# Patient Record
Sex: FEMALE | Race: WHITE | NOT HISPANIC OR LATINO | Employment: FULL TIME | URBAN - METROPOLITAN AREA
[De-identification: names, ages, dates, MRNs, and addresses within clinical notes are randomized per-mention and may not be internally consistent; named-entity substitution may affect disease eponyms.]

---

## 2021-02-22 ENCOUNTER — OFFICE VISIT (OUTPATIENT)
Dept: FAMILY MEDICINE CLINIC | Facility: CLINIC | Age: 26
End: 2021-02-22

## 2021-02-22 VITALS
TEMPERATURE: 100.2 F | WEIGHT: 102 LBS | SYSTOLIC BLOOD PRESSURE: 108 MMHG | RESPIRATION RATE: 14 BRPM | DIASTOLIC BLOOD PRESSURE: 70 MMHG | OXYGEN SATURATION: 99 % | HEIGHT: 65 IN | BODY MASS INDEX: 17 KG/M2 | HEART RATE: 90 BPM

## 2021-02-22 DIAGNOSIS — Z11.59 ENCOUNTER FOR HEPATITIS C SCREENING TEST FOR LOW RISK PATIENT: ICD-10-CM

## 2021-02-22 DIAGNOSIS — N30.10 INTERSTITIAL CYSTITIS: Primary | ICD-10-CM

## 2021-02-22 LAB
BILIRUB BLD-MCNC: NEGATIVE MG/DL
CLARITY, POC: CLEAR
COLOR UR: YELLOW
GLUCOSE UR STRIP-MCNC: NEGATIVE MG/DL
KETONES UR QL: NEGATIVE
LEUKOCYTE EST, POC: NEGATIVE
NITRITE UR-MCNC: NEGATIVE MG/ML
PH UR: 6 [PH] (ref 5–8)
PROT UR STRIP-MCNC: NEGATIVE MG/DL
RBC # UR STRIP: NEGATIVE /UL
SP GR UR: 1.03 (ref 1–1.03)
UROBILINOGEN UR QL: NORMAL

## 2021-02-22 PROCEDURE — 99203 OFFICE O/P NEW LOW 30 MIN: CPT | Performed by: FAMILY MEDICINE

## 2021-02-22 PROCEDURE — 81002 URINALYSIS NONAUTO W/O SCOPE: CPT | Performed by: FAMILY MEDICINE

## 2021-02-22 RX ORDER — PENTOSAN POLYSULFATE SODIUM 100 MG/1
100 CAPSULE, GELATIN COATED ORAL
Qty: 90 CAPSULE | Refills: 3 | Status: SHIPPED | OUTPATIENT
Start: 2021-02-22 | End: 2022-02-10

## 2021-02-22 NOTE — PROGRESS NOTES
"Chief Complaint  Establish Care (Hasn't seen a PCP in years ) and Abd pain-dx w/ 'IC'    Subjective          Salud Bocanegra presents to [facility]  History of Present Illness  Here to establish care  Hasn't had primary care in years.   She does have a history of interstitial cystitis, symptoms currently present. Experiences suprapubic pain, dysuria.  Gynecologist initially diagnosed IC, treated with Elmiron, but no longer taking.  States that she does not like to take medications on a regular basis.  Caffeine, chocolate are triggers and will make symptoms worse.  Symptoms have improved since she has decreased her consumption of caffeine.    She does have both anxiety and depression.   Has taken medication to treat in the past, but eventually weans herself off.   Celexa did improve symptoms while taking, however does not wish to resume any medication for treatment of mood at this time.  Prozac caused her to have suicidal ideations, will avoid.     Objective   Vital Signs:   /70   Pulse 90   Temp 100.2 °F (37.9 °C)   Resp 14   Ht 165.1 cm (65\")   Wt 46.3 kg (102 lb)   SpO2 99%   BMI 16.97 kg/m²     Physical Exam  Vitals signs and nursing note reviewed.   Constitutional:       Appearance: She is well-developed.   HENT:      Head: Normocephalic and atraumatic.      Right Ear: Tympanic membrane, ear canal and external ear normal.      Left Ear: Tympanic membrane, ear canal and external ear normal.      Nose: Nose normal.   Eyes:      Conjunctiva/sclera: Conjunctivae normal.   Neck:      Musculoskeletal: Neck supple.   Cardiovascular:      Rate and Rhythm: Normal rate and regular rhythm.      Heart sounds: Normal heart sounds. No murmur.   Pulmonary:      Effort: Pulmonary effort is normal.      Breath sounds: Normal breath sounds. No wheezing.   Abdominal:      Palpations: Abdomen is soft.      Tenderness: There is abdominal tenderness in the suprapubic area. There is no " right CVA tenderness, left CVA tenderness or guarding.   Musculoskeletal:         General: No deformity.   Lymphadenopathy:      Cervical: No cervical adenopathy.   Skin:     General: Skin is warm and dry.   Neurological:      General: No focal deficit present.      Mental Status: She is alert and oriented to person, place, and time.   Psychiatric:         Mood and Affect: Mood normal.         Behavior: Behavior normal.         Thought Content: Thought content normal.        Result Review :                 Assessment and Plan    Diagnoses and all orders for this visit:    1. Interstitial cystitis (Primary)  -     CBC & Differential  -     Comprehensive Metabolic Panel  -     TSH Rfx On Abnormal To Free T4  -     POC Urinalysis Dipstick  -     pentosan polysulfate (Elmiron) 100 MG capsule; Take 1 capsule by mouth 3 (Three) Times a Day Before Meals.  Dispense: 90 capsule; Refill: 3    2. Encounter for hepatitis C screening test for low risk patient  -     Hepatitis C Antibody    UA normal, will resume treatment with Elmiron for IC.  If symptoms persist, consider urology consult.  At this time, she declines treatment for anxiety and depression.  Return if symptoms worsen.    Follow Up   Return in about 6 months (around 8/22/2021) for Recheck.  Patient was given instructions and counseling regarding her condition or for health maintenance advice. Please see specific information pulled into the AVS if appropriate.

## 2021-02-22 NOTE — PATIENT INSTRUCTIONS
Interstitial Cystitis    Interstitial cystitis is inflammation of the bladder. This may cause pain in the bladder area as well as a frequent and urgent need to urinate. The bladder is a hollow organ in the lower part of the abdomen. It stores urine after the urine is made in the kidneys.  The severity of interstitial cystitis can vary from person to person. You may have flare-ups, and then your symptoms may go away for a while. For many people, it becomes a long-term (chronic) problem.  What are the causes?  The cause of this condition is not known.  What increases the risk?  The following factors may make you more likely to develop this condition:  · You are female.  · You have fibromyalgia.  · You have irritable bowel syndrome (IBS).  · You have endometriosis.  This condition may be aggravated by:  · Stress.  · Smoking.  · Spicy foods.  What are the signs or symptoms?  Symptoms of interstitial cystitis vary, and they can change over time. Symptoms may include:  · Discomfort or pain in the bladder area, which is in the lower abdomen. Pain can range from mild to severe. The pain may change in intensity as the bladder fills with urine or as it empties.  · Pain in the pelvic area, between the hip bones.  · An urgent need to urinate.  · Frequent urination.  · Pain during urination.  · Pain during sex.  · Blood in the urine.  For women, symptoms often get worse during menstruation.  How is this diagnosed?  This condition is diagnosed based on your symptoms, your medical history, and a physical exam. You may have tests to rule out other conditions, such as:  · Urine tests.  · Cystoscopy. For this test, a tool similar to a very thin telescope is used to look into your bladder.  · Biopsy. This involves taking a sample of tissue from the bladder to be examined under a microscope.  How is this treated?  There is no cure for this condition, but treatment can help you control your symptoms. Work closely with your health care  provider to find the most effective treatments for you. Treatment options may include:  · Medicines to relieve pain and reduce how often you feel the need to urinate.  · Learning ways to control when you urinate (bladder training).  · Lifestyle changes, such as changing your diet or taking steps to control stress.  · Using a device that provides electrical stimulation to your nerves, which can relieve pain (neuromodulation therapy). The device is placed on your back, where it blocks the nerves that cause you to feel pain in your bladder area.  · A procedure that stretches your bladder by filling it with air or fluid.  · Surgery. This is rare. It is only done for extreme cases, if other treatments do not help.  Follow these instructions at home:  Bladder training    · Use bladder training techniques as directed. Techniques may include:  ? Urinating at scheduled times.  ? Training yourself to delay urination.  ? Doing exercises (Kegel exercises) to strengthen the muscles that control urine flow.  · Keep a bladder diary.  ? Write down the times that you urinate and any symptoms that you have. This can help you find out which foods, liquids, or activities make your symptoms worse.  ? Use your bladder diary to schedule bathroom trips. If you are away from home, plan to be near a bathroom at each of your scheduled times.  · Make sure that you urinate just before you leave the house and just before you go to bed.  Eating and drinking  · Make dietary changes as recommended by your health care provider. You may need to avoid:  ? Spicy foods.  ? Foods that contain a lot of potassium.  · Limit your intake of beverages that make you need to urinate. These include:  ? Caffeinated beverages like soda, coffee, and tea.  ? Alcohol.  General instructions  · Take over-the-counter and prescription medicines only as told by your health care provider.  · Do not drink alcohol.  · You can try a warm or cool compress over your bladder for  comfort.  · Avoid wearing tight clothing.  · Do not use any products that contain nicotine or tobacco, such as cigarettes and e-cigarettes. If you need help quitting, ask your health care provider.  · Keep all follow-up visits as told by your health care provider. This is important.  Contact a health care provider if you have:  · Symptoms that do not get better with treatment.  · Pain or discomfort that gets worse.  · More frequent urges to urinate.  · A fever.  Get help right away if:  · You have no control over when you urinate.  Summary  · Interstitial cystitis is inflammation of the bladder.  · This condition may cause pain in the bladder area as well as a frequent and urgent need to urinate.  · You may have flare-ups of the condition, and then it may go away for a while. For many people, it becomes a long-term (chronic) problem.  · There is no cure for interstitial cystitis, but treatment methods are available to control your symptoms.  This information is not intended to replace advice given to you by your health care provider. Make sure you discuss any questions you have with your health care provider.  Document Revised: 11/30/2018 Document Reviewed: 11/12/2018  Elsevier Patient Education © 2020 [employer]

## 2021-02-23 LAB
ALBUMIN SERPL-MCNC: 4.8 G/DL (ref 3.9–5)
ALBUMIN/GLOB SERPL: 2 {RATIO} (ref 1.2–2.2)
ALP SERPL-CCNC: 74 IU/L (ref 39–117)
ALT SERPL-CCNC: 17 IU/L (ref 0–32)
AST SERPL-CCNC: 20 IU/L (ref 0–40)
BASOPHILS # BLD AUTO: 0.1 X10E3/UL (ref 0–0.2)
BASOPHILS NFR BLD AUTO: 1 %
BILIRUB SERPL-MCNC: 0.3 MG/DL (ref 0–1.2)
BUN SERPL-MCNC: 13 MG/DL (ref 6–20)
BUN/CREAT SERPL: 18 (ref 9–23)
CALCIUM SERPL-MCNC: 9.2 MG/DL (ref 8.7–10.2)
CHLORIDE SERPL-SCNC: 104 MMOL/L (ref 96–106)
CO2 SERPL-SCNC: 21 MMOL/L (ref 20–29)
CREAT SERPL-MCNC: 0.73 MG/DL (ref 0.57–1)
EOSINOPHIL # BLD AUTO: 0.2 X10E3/UL (ref 0–0.4)
EOSINOPHIL NFR BLD AUTO: 3 %
ERYTHROCYTE [DISTWIDTH] IN BLOOD BY AUTOMATED COUNT: 11.8 % (ref 11.7–15.4)
GLOBULIN SER CALC-MCNC: 2.4 G/DL (ref 1.5–4.5)
GLUCOSE SERPL-MCNC: 85 MG/DL (ref 65–99)
HCT VFR BLD AUTO: 39.7 % (ref 34–46.6)
HCV AB S/CO SERPL IA: <0.1 S/CO RATIO (ref 0–0.9)
HGB BLD-MCNC: 13.6 G/DL (ref 11.1–15.9)
IMM GRANULOCYTES # BLD AUTO: 0 X10E3/UL (ref 0–0.1)
IMM GRANULOCYTES NFR BLD AUTO: 1 %
LYMPHOCYTES # BLD AUTO: 2 X10E3/UL (ref 0.7–3.1)
LYMPHOCYTES NFR BLD AUTO: 28 %
MCH RBC QN AUTO: 31.1 PG (ref 26.6–33)
MCHC RBC AUTO-ENTMCNC: 34.3 G/DL (ref 31.5–35.7)
MCV RBC AUTO: 91 FL (ref 79–97)
MONOCYTES # BLD AUTO: 0.4 X10E3/UL (ref 0.1–0.9)
MONOCYTES NFR BLD AUTO: 6 %
NEUTROPHILS # BLD AUTO: 4.3 X10E3/UL (ref 1.4–7)
NEUTROPHILS NFR BLD AUTO: 61 %
PLATELET # BLD AUTO: 239 X10E3/UL (ref 150–450)
POTASSIUM SERPL-SCNC: 3.8 MMOL/L (ref 3.5–5.2)
PROT SERPL-MCNC: 7.2 G/DL (ref 6–8.5)
RBC # BLD AUTO: 4.37 X10E6/UL (ref 3.77–5.28)
SODIUM SERPL-SCNC: 139 MMOL/L (ref 134–144)
TSH SERPL DL<=0.005 MIU/L-ACNC: 1.14 UIU/ML (ref 0.45–4.5)
WBC # BLD AUTO: 7 X10E3/UL (ref 3.4–10.8)

## 2022-02-10 ENCOUNTER — OFFICE VISIT (OUTPATIENT)
Dept: FAMILY MEDICINE CLINIC | Facility: CLINIC | Age: 27
End: 2022-02-10

## 2022-02-10 VITALS
OXYGEN SATURATION: 100 % | HEIGHT: 65 IN | HEART RATE: 68 BPM | SYSTOLIC BLOOD PRESSURE: 110 MMHG | RESPIRATION RATE: 20 BRPM | TEMPERATURE: 99 F | WEIGHT: 108.6 LBS | DIASTOLIC BLOOD PRESSURE: 60 MMHG | BODY MASS INDEX: 18.09 KG/M2

## 2022-02-10 DIAGNOSIS — M79.641 BILATERAL HAND PAIN: Primary | ICD-10-CM

## 2022-02-10 DIAGNOSIS — M79.642 BILATERAL HAND PAIN: Primary | ICD-10-CM

## 2022-02-10 DIAGNOSIS — F17.200 CURRENT SMOKER: ICD-10-CM

## 2022-02-10 PROCEDURE — 99213 OFFICE O/P EST LOW 20 MIN: CPT | Performed by: FAMILY MEDICINE

## 2022-02-10 RX ORDER — BUPROPION HYDROCHLORIDE 150 MG/1
TABLET, EXTENDED RELEASE ORAL
Qty: 180 TABLET | Refills: 0 | Status: SHIPPED | OUTPATIENT
Start: 2022-02-10

## 2022-02-10 RX ORDER — IBUPROFEN 800 MG/1
800 TABLET ORAL EVERY 8 HOURS PRN
Qty: 60 TABLET | Refills: 1 | Status: SHIPPED | OUTPATIENT
Start: 2022-02-10

## 2022-02-10 NOTE — PROGRESS NOTES
"Chief Complaint  bilateral hand pain (getting worse over the past 6 months ) and Nicotine Dependence (pt has not tried any medication to help stop before )    Subjective          Salud Bocanegra presents to [facility]  History of Present Illness  Bilateral hand pain getting worse x 6 months  Years ago, she was told that she has autoimmune disorder, but have never been provide specific diagnosis.  Overuse with hand and wrist makes pain worse  Edema in fingers and hands at times  Denies numbness, tingling, burning   Treats with ibuprofen as needed, which does help her symptoms    She is interested in quitting smoking.   Has anxiety, smoking is one way she deals with stress.   Worried about stopping smoking causing anxiety to worsen, but knows this would be best for her in [facility].     Answers for HPI/ROS submitted by the patient on 2/10/2022  Please describe your symptoms.: Join pain and occasional rash on joints  Have you had these symptoms before?: Yes  How long have you been having these symptoms?: Greater than 2 weeks  Please list any medications you are currently taking for this condition.: None  What is the primary reason for your visit?: Other      The following portions of the patient's history were reviewed and updated as appropriate: allergies, current medications, past family history, past medical history, past social history, past surgical history and problem list.    Objective   Vital Signs:   /60   Pulse 68   Temp 99 °F (37.2 °C)   Resp 20   Ht 165.1 cm (65\")   Wt 49.3 kg (108 lb 9.6 oz)   SpO2 100%   BMI 18.07 kg/m²     Physical Exam  Vitals and nursing note reviewed.   Constitutional:       Appearance: She is well-developed.   HENT:      Head: Normocephalic and atraumatic.      Right Ear: External ear normal.      Left Ear: External ear normal.      Nose: Nose normal.   Eyes:      Conjunctiva/sclera: Conjunctivae normal.   Pulmonary:      Effort: " Pulmonary effort is normal. No respiratory distress.   Musculoskeletal:         General: No swelling or deformity.   Skin:     General: Skin is warm.   Neurological:      Mental Status: She is alert and oriented to person, place, and time.   Psychiatric:         Behavior: Behavior normal.        Result Review :                 Assessment and Plan    Diagnoses and all orders for this visit:    1. Bilateral hand pain (Primary)  -     MARLEY by IFA, Reflex 9-biomarkers profile; Future  -     Sedimentation Rate; Future  -     C-reactive Protein; Future  -     Rheumatoid Factor; Future  -     ibuprofen (ADVIL,MOTRIN) 800 MG tablet; Take 1 tablet by mouth Every 8 (Eight) Hours As Needed for Moderate Pain .  Dispense: 60 tablet; Refill: 1  -     Comprehensive Metabolic Panel; Future    2. Current smoker  -     buPROPion SR (Wellbutrin SR) 150 MG 12 hr tablet; Take 1 tab po qd x 3 days, then increase to 1 tab BID  Dispense: 180 tablet; Refill: 0  -     Comprehensive Metabolic Panel; Future    She will return to complete labs to evaluate for possible autoimmune condition causing chronic joint pain.   Joint pain does resolve with ibuprofen, continue as needed  Start bupropion to help with tobacco cessation.       Follow Up   Return if symptoms worsen or fail to improve.  Patient was given instructions and counseling regarding her condition or for health maintenance advice. Please see specific information pulled into the AVS if appropriate.

## 2022-03-18 DIAGNOSIS — M79.642 BILATERAL HAND PAIN: ICD-10-CM

## 2022-03-18 DIAGNOSIS — F17.200 CURRENT SMOKER: ICD-10-CM

## 2022-03-18 DIAGNOSIS — M79.641 BILATERAL HAND PAIN: ICD-10-CM

## 2022-03-20 LAB
ALBUMIN SERPL-MCNC: 4.6 G/DL (ref 3.9–5)
ALBUMIN/GLOB SERPL: 2.1 {RATIO} (ref 1.2–2.2)
ALP SERPL-CCNC: 66 IU/L (ref 44–121)
ALT SERPL-CCNC: 13 IU/L (ref 0–32)
ANA TITR SER IF: NEGATIVE {TITER}
AST SERPL-CCNC: 18 IU/L (ref 0–40)
BILIRUB SERPL-MCNC: 0.2 MG/DL (ref 0–1.2)
BUN SERPL-MCNC: 19 MG/DL (ref 6–20)
BUN/CREAT SERPL: 27 (ref 9–23)
CALCIUM SERPL-MCNC: 9.5 MG/DL (ref 8.7–10.2)
CHLORIDE SERPL-SCNC: 103 MMOL/L (ref 96–106)
CO2 SERPL-SCNC: 20 MMOL/L (ref 20–29)
CREAT SERPL-MCNC: 0.7 MG/DL (ref 0.57–1)
CRP SERPL-MCNC: <1 MG/L (ref 0–10)
EGFRCR SERPLBLD CKD-EPI 2021: 120 ML/MIN/1.73
ERYTHROCYTE [SEDIMENTATION RATE] IN BLOOD BY WESTERGREN METHOD: 2 MM/HR (ref 0–32)
GLOBULIN SER CALC-MCNC: 2.2 G/DL (ref 1.5–4.5)
GLUCOSE SERPL-MCNC: 86 MG/DL (ref 65–99)
LABORATORY COMMENT REPORT: NORMAL
POTASSIUM SERPL-SCNC: 4 MMOL/L (ref 3.5–5.2)
PROT SERPL-MCNC: 6.8 G/DL (ref 6–8.5)
RHEUMATOID FACT SERPL-ACNC: <10 IU/ML
SODIUM SERPL-SCNC: 139 MMOL/L (ref 134–144)

## 2022-03-21 ENCOUNTER — TELEPHONE (OUTPATIENT)
Dept: FAMILY MEDICINE CLINIC | Facility: CLINIC | Age: 27
End: 2022-03-21

## 2022-03-21 NOTE — TELEPHONE ENCOUNTER
Caller: Angelina Velazquez    Relationship: Self    Best call back number: [phone]    What is the best time to reach you:10:30 IS BEST IF NO ANSWER  LEAVE A VOICEMAIL    Who are you requesting to speak with (clinical staff, provider,  specific staff member): DR BLOUNT    Do you know the name of the person who called: ANGELINA VELAZQUEZ    What was the call regarding: WANTS SOMEONE TO GO OVER TEST RESULTS WITH HER    Do you require a callback: YES

## 2022-03-27 DIAGNOSIS — M79.641 BILATERAL HAND PAIN: Primary | ICD-10-CM

## 2022-03-27 DIAGNOSIS — M79.642 BILATERAL HAND PAIN: Primary | ICD-10-CM

## 2022-11-23 ENCOUNTER — TELEPHONE (OUTPATIENT)
Dept: FAMILY MEDICINE CLINIC | Facility: CLINIC | Age: 27
End: 2022-11-23

## 2022-11-23 NOTE — TELEPHONE ENCOUNTER
Caller: Salud Bocanegra    Relationship: Self    Best call back number:    [phone]        What medication are you requesting: MEDICATION FOR SYMPTOMS  What are your current symptoms: BURNING UPON URINATION    How long have you been experiencing symptoms: 1 DAY  Have you had these symptoms before:    [x] Yes  [] No    Have you been treated for these symptoms before:   [x] Yes  [] No    If a prescription is needed, what is your preferred pharmacy and phone number: [facility] [address] [place] [address] [phone] [facility] [phone]

## 2022-11-23 NOTE — TELEPHONE ENCOUNTER
Pt advised to go to urgent care, [facility] or express care since we didn't have any openings to evaluate her today

## 2022-12-15 ENCOUNTER — OFFICE VISIT (OUTPATIENT)
Dept: FAMILY MEDICINE CLINIC | Facility: CLINIC | Age: 27
End: 2022-12-15

## 2022-12-15 VITALS
TEMPERATURE: 97.7 F | DIASTOLIC BLOOD PRESSURE: 60 MMHG | RESPIRATION RATE: 16 BRPM | HEIGHT: 65 IN | HEART RATE: 71 BPM | BODY MASS INDEX: 17.83 KG/M2 | OXYGEN SATURATION: 98 % | SYSTOLIC BLOOD PRESSURE: 110 MMHG | WEIGHT: 107 LBS

## 2022-12-15 DIAGNOSIS — R10.11 RUQ PAIN: ICD-10-CM

## 2022-12-15 DIAGNOSIS — R42 LIGHTHEADEDNESS: ICD-10-CM

## 2022-12-15 DIAGNOSIS — R00.2 HEART PALPITATIONS: Primary | ICD-10-CM

## 2022-12-15 PROCEDURE — 99214 OFFICE O/P EST MOD 30 MIN: CPT | Performed by: FAMILY MEDICINE

## 2022-12-15 RX ORDER — HYDROCODONE BITARTRATE AND ACETAMINOPHEN 5; 325 MG/1; MG/1
TABLET ORAL
COMMUNITY
Start: 2022-12-11

## 2022-12-15 RX ORDER — METOPROLOL SUCCINATE 25 MG/1
25 TABLET, EXTENDED RELEASE ORAL
COMMUNITY
Start: 2022-12-10 | End: 2022-12-15 | Stop reason: SDUPTHER

## 2022-12-15 RX ORDER — METOPROLOL SUCCINATE 25 MG/1
25 TABLET, EXTENDED RELEASE ORAL DAILY
Qty: 90 TABLET | Refills: 0 | Status: SHIPPED | OUTPATIENT
Start: 2022-12-15

## 2022-12-15 NOTE — PROGRESS NOTES
"Chief Complaint  Palpitations (X1mo, sees cardiology in [place]) and Abdominal Pain (ED 12.11.22, [facility] )    Subjective          Salud Bocanegra presents to [facility]  History of Present Illness    The patient has been experiencing palpitations for the last 1 month.   She was evaluated by a cardiologist in [place], [place], Dr. Rossana Loaiza, who completed her evaluation and started her on metoprolol. The patient reports her symptoms have improved on the medication.     The patient reports that when she stands up she experiences palpitations.   She notes her work-up in cardiology included a Holter monitor; however, she reports being ill with influenza during the work-up and  the cardiologist expressed her symptoms were more neurological in nature.   The patient states she experienced some dizziness, shortness of breath, and near syncope.  She has a history of an abnormal EKG which was completed at work; hoewver, her stress test results were within normal limits and her echocardiogram was within normal limits.   The patient denies losing consciousness; however, she does feel lightheaded, and tachypnea when she feels her palpitations.   She reports a positive family history of cardiac problems in her father who had a history of congestive heart failure and passed away at 51-years-old from myocardial infarction.    Additionally, she was seen at [facility] emergency room on 12/11/2022 for evaluation of abdominal pain.   She states \"it was the worst pain she ever felt in her life\" in upper right quadrant.   The patient completed a CT scan revealing negative findings for gallstones.   She notes her symptoms have improved slightly.   The patient notes consuming pizza prior to experiencing her abdominal pain.   She denies heart burn at the time of her abdominal pain; however, reports nausea and diarrhea that began 30 mins after the pain started that was bright " orange in color. She adds taking antidiarrheals.    The following portions of the patient's history were reviewed and updated as appropriate: allergies, current medications, past family history, past medical history, past social history, past surgical history and problem list.    Objective      Physical Exam  Vitals reviewed.   Constitutional:       Appearance: She is well-developed.   HENT:      Head: Normocephalic and atraumatic.   Neck:      Vascular: No carotid bruit.   Cardiovascular:      Rate and Rhythm: Normal rate and regular rhythm.      Heart sounds: Normal heart sounds.   Pulmonary:      Effort: Pulmonary effort is normal.      Breath sounds: Normal breath sounds.   Abdominal:      Palpations: Abdomen is soft.      Tenderness: There is no abdominal tenderness. There is no guarding. Negative signs include Simpson's sign.   Neurological:      Mental Status: She is alert and oriented to person, place, and time.   Psychiatric:         Behavior: Behavior normal.        Result Review :                Assessment and Plan    Diagnoses and all orders for this visit:    1. Heart palpitations (Primary)  Comments:  Refills sent to pharmacy of metorpolol. Complete laboratory studies. Consider referral to new cardiologist.  Orders:  -     CBC & Differential  -     Comprehensive Metabolic Panel  -     TSH  -     metoprolol succinate XL (TOPROL-XL) 25 MG 24 hr tablet; Take 1 tablet by mouth Daily.  Dispense: 90 tablet; Refill: 0    2. Lightheadedness  Comments:  Consider referral to neurology if symptoms persist.  Orders:  -     Duplex Carotid Ultrasound CAR; Future  -     CBC & Differential  -     Comprehensive Metabolic Panel  -     TSH    3. RUQ pain  Comments:  Complete  gallbladder ultrasound.  Orders:  -     CBC & Differential  -     Comprehensive Metabolic Panel  -     TSH  -     US Gallbladder; Future        Follow Up   No follow-ups on file.  Patient was given instructions and counseling regarding her condition  or for health maintenance advice. Please see specific information pulled into the AVS if appropriate.     Transcribed from ambient dictation for Ramona Mcnally DO by Elyssa Segundo.  12/16/22   10:13 EST    Patient or patient representative verbalized consent to the visit recording.  I have personally performed the services described in this document as transcribed by the above individual, and it is both accurate and complete.  Ramona Mcnally DO  12/20/2022  12:28 EST

## 2022-12-16 LAB
ALBUMIN SERPL-MCNC: 4.5 G/DL (ref 3.9–5)
ALBUMIN/GLOB SERPL: 2 {RATIO} (ref 1.2–2.2)
ALP SERPL-CCNC: 87 IU/L (ref 44–121)
ALT SERPL-CCNC: 7 IU/L (ref 0–32)
AST SERPL-CCNC: 10 IU/L (ref 0–40)
BASOPHILS # BLD AUTO: 0.1 X10E3/UL (ref 0–0.2)
BASOPHILS NFR BLD AUTO: 1 %
BILIRUB SERPL-MCNC: 0.3 MG/DL (ref 0–1.2)
BUN SERPL-MCNC: 7 MG/DL (ref 6–20)
BUN/CREAT SERPL: 10 (ref 9–23)
CALCIUM SERPL-MCNC: 9.2 MG/DL (ref 8.7–10.2)
CHLORIDE SERPL-SCNC: 105 MMOL/L (ref 96–106)
CO2 SERPL-SCNC: 24 MMOL/L (ref 20–29)
CREAT SERPL-MCNC: 0.69 MG/DL (ref 0.57–1)
EGFRCR SERPLBLD CKD-EPI 2021: 120 ML/MIN/1.73
EOSINOPHIL # BLD AUTO: 0.3 X10E3/UL (ref 0–0.4)
EOSINOPHIL NFR BLD AUTO: 4 %
ERYTHROCYTE [DISTWIDTH] IN BLOOD BY AUTOMATED COUNT: 12.6 % (ref 11.7–15.4)
GLOBULIN SER CALC-MCNC: 2.3 G/DL (ref 1.5–4.5)
GLUCOSE SERPL-MCNC: 73 MG/DL (ref 70–99)
HCT VFR BLD AUTO: 34.4 % (ref 34–46.6)
HGB BLD-MCNC: 12.2 G/DL (ref 11.1–15.9)
IMM GRANULOCYTES # BLD AUTO: 0 X10E3/UL (ref 0–0.1)
IMM GRANULOCYTES NFR BLD AUTO: 0 %
LYMPHOCYTES # BLD AUTO: 1.7 X10E3/UL (ref 0.7–3.1)
LYMPHOCYTES NFR BLD AUTO: 25 %
MCH RBC QN AUTO: 31.4 PG (ref 26.6–33)
MCHC RBC AUTO-ENTMCNC: 35.5 G/DL (ref 31.5–35.7)
MCV RBC AUTO: 89 FL (ref 79–97)
MONOCYTES # BLD AUTO: 0.5 X10E3/UL (ref 0.1–0.9)
MONOCYTES NFR BLD AUTO: 7 %
NEUTROPHILS # BLD AUTO: 4.4 X10E3/UL (ref 1.4–7)
NEUTROPHILS NFR BLD AUTO: 63 %
PLATELET # BLD AUTO: 218 X10E3/UL (ref 150–450)
POTASSIUM SERPL-SCNC: 3.9 MMOL/L (ref 3.5–5.2)
PROT SERPL-MCNC: 6.8 G/DL (ref 6–8.5)
RBC # BLD AUTO: 3.88 X10E6/UL (ref 3.77–5.28)
SODIUM SERPL-SCNC: 140 MMOL/L (ref 134–144)
TSH SERPL DL<=0.005 MIU/L-ACNC: 0.58 UIU/ML (ref 0.45–4.5)
WBC # BLD AUTO: 7 X10E3/UL (ref 3.4–10.8)

## 2023-01-09 ENCOUNTER — APPOINTMENT (OUTPATIENT)
Dept: ULTRASOUND IMAGING | Facility: HOSPITAL | Age: 28
End: 2023-01-09
Payer: COMMERCIAL

## 2023-01-09 ENCOUNTER — APPOINTMENT (OUTPATIENT)
Dept: CARDIOLOGY | Facility: HOSPITAL | Age: 28
End: 2023-01-09
Payer: COMMERCIAL

## 2023-02-06 ENCOUNTER — APPOINTMENT (OUTPATIENT)
Dept: ULTRASOUND IMAGING | Facility: HOSPITAL | Age: 28
End: 2023-02-06
Payer: COMMERCIAL

## 2023-02-06 ENCOUNTER — HOSPITAL ENCOUNTER (OUTPATIENT)
Dept: CARDIOLOGY | Facility: HOSPITAL | Age: 28
Discharge: HOME | End: 2023-02-06
Payer: COMMERCIAL

## 2023-02-06 ENCOUNTER — HOSPITAL ENCOUNTER (OUTPATIENT)
Dept: ULTRASOUND IMAGING | Facility: HOSPITAL | Age: 28
Discharge: HOME | End: 2023-02-06
Payer: COMMERCIAL

## 2023-02-06 VITALS — WEIGHT: 105.82 LBS | HEIGHT: 65 IN | BODY MASS INDEX: 17.63 KG/M2

## 2023-02-06 DIAGNOSIS — R42 LIGHTHEADEDNESS: ICD-10-CM

## 2023-02-06 DIAGNOSIS — R10.11 RUQ PAIN: ICD-10-CM

## 2023-02-06 LAB
BH CV XLRA MEAS LEFT DIST CCA EDV: 30.6 CM/SEC
BH CV XLRA MEAS LEFT DIST CCA PSV: 81.7 CM/SEC
BH CV XLRA MEAS LEFT DIST ICA EDV: -40.9 CM/SEC
BH CV XLRA MEAS LEFT DIST ICA PSV: -105 CM/SEC
BH CV XLRA MEAS LEFT ICA/CCA RATIO: 0.84
BH CV XLRA MEAS LEFT MID CCA EDV: 29.1 CM/SEC
BH CV XLRA MEAS LEFT MID CCA PSV: 109 CM/SEC
BH CV XLRA MEAS LEFT MID ICA EDV: -36.1 CM/SEC
BH CV XLRA MEAS LEFT MID ICA PSV: -92.7 CM/SEC
BH CV XLRA MEAS LEFT PROX CCA EDV: 34.6 CM/SEC
BH CV XLRA MEAS LEFT PROX CCA PSV: 135 CM/SEC
BH CV XLRA MEAS LEFT PROX ECA EDV: -24.4 CM/SEC
BH CV XLRA MEAS LEFT PROX ECA PSV: -95.9 CM/SEC
BH CV XLRA MEAS LEFT PROX ICA EDV: -24.4 CM/SEC
BH CV XLRA MEAS LEFT PROX ICA PSV: -71.5 CM/SEC
BH CV XLRA MEAS LEFT PROX SCLA PSV: 109 CM/SEC
BH CV XLRA MEAS LEFT VERTEBRAL A EDV: 23.6 CM/SEC
BH CV XLRA MEAS LEFT VERTEBRAL A PSV: 67.6 CM/SEC
BH CV XLRA MEAS RIGHT DIST CCA EDV: -28.3 CM/SEC
BH CV XLRA MEAS RIGHT DIST CCA PSV: -94.3 CM/SEC
BH CV XLRA MEAS RIGHT DIST ICA EDV: -40.1 CM/SEC
BH CV XLRA MEAS RIGHT DIST ICA PSV: -103 CM/SEC
BH CV XLRA MEAS RIGHT ICA/CCA RATIO: 0.86
BH CV XLRA MEAS RIGHT MID CCA EDV: 29.1 CM/SEC
BH CV XLRA MEAS RIGHT MID CCA PSV: 95.9 CM/SEC
BH CV XLRA MEAS RIGHT MID ICA EDV: -36.9 CM/SEC
BH CV XLRA MEAS RIGHT MID ICA PSV: -82.5 CM/SEC
BH CV XLRA MEAS RIGHT PROX CCA EDV: 22 CM/SEC
BH CV XLRA MEAS RIGHT PROX CCA PSV: 108 CM/SEC
BH CV XLRA MEAS RIGHT PROX ECA EDV: -26.7 CM/SEC
BH CV XLRA MEAS RIGHT PROX ECA PSV: -101 CM/SEC
BH CV XLRA MEAS RIGHT PROX ICA EDV: -27.5 CM/SEC
BH CV XLRA MEAS RIGHT PROX ICA PSV: -77.8 CM/SEC
BH CV XLRA MEAS RIGHT PROX SCLA PSV: 93.5 CM/SEC
BH CV XLRA MEAS RIGHT VERTEBRAL A EDV: 12.6 CM/SEC
BH CV XLRA MEAS RIGHT VERTEBRAL A PSV: 45.6 CM/SEC
LEFT ARM BP: NORMAL MMHG
MAXIMAL PREDICTED HEART RATE: 191 BPM
RIGHT ARM BP: NORMAL MMHG
STRESS TARGET HR: 162 BPM

## 2023-02-06 PROCEDURE — 76705 ECHO EXAM OF ABDOMEN: CPT

## 2023-02-06 PROCEDURE — 93880 EXTRACRANIAL BILAT STUDY: CPT

## 2023-02-06 PROCEDURE — 93880 EXTRACRANIAL BILAT STUDY: CPT | Performed by: INTERNAL MEDICINE

## 2023-08-17 ENCOUNTER — E-VISIT (OUTPATIENT)
Dept: FAMILY MEDICINE CLINIC | Facility: TELEHEALTH | Age: 28
End: 2023-08-17
Payer: COMMERCIAL

## 2023-08-17 NOTE — E-VISIT TREATED
Chief Complaint: Bladder infection (UTI)   Patient introduction   Patient is 28-year-old female. Patient provided the following organ inventory: Presence of a vagina, ovaries, and breasts.   Patient has had dysuria for 1 to 3 days.   Urine is yellow with a strong or pungent odor.   General presentation   Patient has not had a fever. No nausea or vomiting.   Moderate abdominal or pelvic pain.   No back pain.   No flank pain.   The following treatments were helpful for current symptoms:    Ibuprofen    Phenazopyridine   The following treatments were not helpful for current symptoms:    Acetaminophen   Previous history of UTI. Current symptoms feel exactly the same as previous UTIs. Received treatment for UTI 1 to 3 times in last year. Patient's last use of antibiotics for UTI was more than 3 months ago.   No known history of yeast infections as a result of taking antibiotics for past UTIs.   No history of pyelonephritis. No history of kidney stones.   Had sexual intercourse in the past week. Does not use diaphragm. No unprotected sexual intercourse with a new partner in the last 2 weeks. Has not been exposed to sexually transmitted infections in the last month.   No recent history of cycling, motorcycling, or riding horseback. Does not wear tight-fitting pants/undergarments. No new soap, lotion, or cosmetics.   Patient is not being treated for diabetes mellitus.   Review of red flags/alarm symptoms:    No recent hospitalizations or nursing home care (last 3 months)    No history of renal failure    No recent history of urologic instrumentation    No anatomic abnormalities of the urinary tract    No abnormal vaginal discharge    No visible vaginal sores    No pain with sexual intercourse    No abnormal vaginal bleeding or spotting   Pregnancy/menstrual status/breastfeeding:   Patient is not pregnant. Patient is not breastfeeding. Regarding date of last menstrual period, patient writes: I had a partial hysterectomy  .   Current medications   Currently taking metoprolol succinate XL 25 MG 24 hr tablet.   Medication allergies   None.   Medication contraindication review   Not taking ACE inhibitors and ARBs.   No history of Toya-Danlos syndrome, folate deficiency, G6PD deficiency, high blood pressure, aortic aneurysm or dissection, Marfan syndrome, megaloblastic anemia, mononucleosis, myasthenia gravis, oliguria/anuria, or peripheral vascular disease.   No known history of amoxicillin-clavulanate-associated cholestatic jaundice or nitrofurantoin-associated cholestatic jaundice.   Past medical history   Immune conditions: No immunocompromising conditions.   No history of cancer.   Patient did not request an excuse note.   Assessment   Uncomplicated acute UTI.   This is the likely diagnosis based on patient's interview responses, including:    Symptom profile    Previous history of UTI    Current symptoms are exactly the same as previous UTIs   No recent history of kidney stones.   Plan   Medications:    nitrofurantoin monohydrate/macrocrystals 100 mg capsule RX 100mg 1 cap PO q12h 5d for infection. This medication is an antibiotic. Take it exactly as directed. You must finish the entire course of medication, even if you feel better after taking the first few doses. Amount is 10 cap.   The patient's prescription will be sent to:   [facility]   [address] [place] [address]   Phone: [phone]     Fax: [phone]   Education:    Condition and causes    Prevention    Treatment and self-care    When to call provider   Follow-up:   Patient to follow up as needed for progression or lack of improvement in symptoms within 3d.   ----------   Electronically signed by HIRAL Santos on 2023-08-17 at 15:54PM   ----------   Patient Interview Transcript:   Knowing about your anatomy is important for diagnosing and treating UTIs. The gender we have on file for you is female, but we realize that this might not tell the  whole story. Would you like to tell us more about your anatomy?    Yes   Not selected:    No   OK, which of these do you have? Select all that apply.    Vagina    Ovaries    Breasts   Not selected:    Uterus    Penis    Testes    Prostate   Which of these symptoms do you have? Select all that apply.    Pain or burning while urinating   Not selected:    Frequent urination    Sudden urge to urinate and it's hard to hold the urine in   How long have you had these symptoms? Select one.    1 to 3 days   Not selected:    Less than 24 hours    4 to 6 days    7 to 10 days    More than 10 days   Since your current symptoms started, has it been difficult to start, stop, or delay urination? Select one.    No   Not selected:    Yes   What color is your urine? Select one.    Yellow   Not selected:    Clear    Cloudy    Pink or red   Does your urine smell strange (like ammonia) or stronger than usual? Select one.    Yes   Not selected:    No   Do you also have any of these symptoms? Select all that apply.    Pain, pressure, or discomfort in the lower abdomen   Not selected:    Fever    Nausea    Vomiting    Back pain    No   How would you describe your lower abdominal pain, pressure, or discomfort? Select one.    Moderate; it's uncomfortable and gets in the way of doing daily tasks   Not selected:    Mild; I only notice it when I pay attention to it    Severe; I can't get comfortable, and it stops me from doing daily tasks   Do you have any flank pain? The flank is the side of the body between the ribs and the hips.    No   Not selected:    Yes, in my left flank    Yes, in my right flank    Yes, in both my left and right flanks   Do you have any of these vaginal symptoms? Select all that apply.    No   Not selected:    Abnormal vaginal itching    Unscheduled or abnormal vaginal bleeding or spotting    Pain during sex    Visible sores on the vagina    Abnormal vaginal discharge   In the past 2 weeks, have you had a medical device  or instrument placed in your urinary tract? Examples include catheters, stents, and nephrostomy tubes. Select one.    No   Not selected:    Yes   Have you recently been hospitalized or been a resident of a nursing home or other long-term care facility? This doesn't include emergency room (ER) visits. Select one.    No   Not selected:    Yes, within the last 2 weeks    Yes, within the last 3 months   Have you ever had severe problems with your kidneys, such as kidney failure? Select one.    No, not that I recall   Not selected:    Yes   Kidney stones    No   Not selected:    Within the last year    More than a year ago   Kidney infection (pyelonephritis)    No   Not selected:    Within the last year    More than a year ago   Have you ever been diagnosed with any of these? Select all that apply.    No   Not selected:    Urinary reflux    Bladder diverticula    Single (or horseshoe) kidney    Duplicated urethra   Have you recently spent a lot of time cycling, riding a motorcycle, or horseback riding? Select one.    No   Not selected:    Yes   Have you recently held your urine for a long time after you felt the urge to go? Select one.    No   Not selected:    Yes   Have you recently avoided eating or drinking so you wouldn't have the urge to urinate as often? Select one.    No   Not selected:    Yes   Have you recently worn any tight-fitting underwear, tights, leggings, pants, or jeans? Select one.    No   Not selected:    Yes   Have you recently used any new soaps, lotions, or cosmetics on your genital area? Select one.    No   Not selected:    Yes   Do you use a diaphragm? Select one.    No   Not selected:    Yes   Are you pregnant? Select one.    No   Not selected:    Yes   When was your last menstrual period? If you don't currently have periods or no longer have periods, please briefly explain.    I had a partial hysterectomy   Are you breastfeeding? Select one.    No   Not selected:    Yes   Have you had sexual  intercourse in the past week? Recent sexual intercourse is a risk factor for urinary tract infections. Select one.    Yes   Not selected:    No   Have you been exposed to a sexually transmitted infection (STI or STD) in the last month? Examples include chlamydia, gonorrhea, trichomoniasis, and herpes. Select one.    No, not that I know of   Not selected:    Yes   Have you had unprotected sexual intercourse with a new partner in the last 2 weeks? Select one.    No   Not selected:    Yes   Have you traveled to any of these countries within the last 3 months? Recent travel to these countries may affect which medication we recommend for your symptoms. Select all that apply.    None of these   Not selected:    [place]    [place]    [place]    [place]   Acetaminophen (Tylenol)    Not helpful   Not selected:    Helpful   Ibuprofen (Advil, Motrin)    Helpful   Not selected:    Not helpful   Phenazopyridine (Azo, Baridium, Pyridium, Uricalm, Uristat)    Helpful   Not selected:    Not helpful   Have you ever had a urinary tract infection (UTI)? A UTI is often called a bladder infection or acute cystitis. Select one.    Yes   Not selected:    No, not that I know of   How much do your current symptoms feel like past UTIs? Select one.    Exactly the same   Not selected:    Mostly the same    Somewhat the same    Totally different   In the past year, how many times have you taken antibiotics for a UTI? Select one.    1 to 3   Not selected:    0    4 or more   When did you last take antibiotics for a UTI? Select one.    More than 3 months ago   Not selected:    Within the last 3 months   Have you ever developed a yeast infection as a result of taking antibiotics? Select one.    No, not that I know of   Not selected:    Yes   UTIs may be more serious when other factors are present. Let's address those now. Are you being treated for type 1 or type 2 diabetes? Select one.    No   Not selected:    Yes   Do you have any of these conditions  that can affect the immune system? Scroll to see all options. Select all that apply.    None of these   Not selected:    History of bone marrow transplant    Chronic kidney disease    Chronic liver disease (including cirrhosis)    HIV/AIDS    Inflammatory bowel disease (Crohn's disease or ulcerative colitis)    Lupus    Moderate to severe plaque psoriasis    Multiple sclerosis    Rheumatoid arthritis    Sickle cell anemia    Alpha or beta thalassemia    History of solid organ transplant (kidney, liver, or heart)    History of spleen removal    An autoimmune disorder not listed here (specify)    A condition requiring treatment with long-term use of oral steroids (such as prednisone, prednisolone, or dexamethasone) (specify)   Have you ever been diagnosed with cancer? Select one.    No   Not selected:    Yes, I have cancer now    Yes, but I'm in remission   These last few questions will help us create the right treatment plan for you. Are you being treated for any of these conditions? Select all that apply.    No   Not selected:    Toya-Danlos syndrome    Folate deficiency    G6PD deficiency    High blood pressure    History of aortic aneurysm or dissection    Marfan syndrome    Megaloblastic anemia    Mono (mononucleosis)    Myasthenia gravis    Oliguria or anuria    Peripheral vascular disease   Have you ever had jaundice as a result of taking amoxicillin-clavulanate (Augmentin) or nitrofurantoin (Macrobid)? Select all that apply.    No   Not selected:    Yes, from amoxicillin-clavulanate (Augmentin)    Yes, from nitrofurantoin (Macrobid, Macrodantin)   Are you taking any of these medications? Select all that apply.    No   Not selected:    An ACE inhibitor such as lisinopril, enalapril, captopril, or benazepril    An angiotensin II receptor blocker (ARB) such as candesartan, irbesartan, losartan, or valsartan   Are you still taking these medications listed in your medical record? If you're not taking any of  these, click Next. Select all that apply.    metoprolol succinate XL 25 MG 24 hr tablet   Are you taking any other medications, vitamins, or supplements? Select one.    No   Not selected:    Yes   Have you ever had an allergic or bad reaction to any medication? Select one.    No   Not selected:    Yes   Do you need a doctor's note? A doctor's note confirms that you received care today and states when you can return to school or work. It does not contain information about your diagnosis or treatment plan. Your provider will make the final decision on whether to give you a doctor's note. Doctor's notes CANNOT be backdated. Select one.    No   Not selected:    Today only (1 day)    Today and tomorrow (2 days)    3 days   Is there anything you'd like to add about your symptoms? Please limit your comments to the symptoms asked about in this interview. If you include comments about other concerns, your provider may recommend that you be seen in person.   The patient did not enter any additional information.   ----------   Medical history   The following information was received from the EMR on August 17, 2023.   Allergies:    No Known Allergies   - Allergy Type:   - Reaction:   - Severity: None   - Clinical Status: Active   - Verification Status: Confirmed   Medications:    ibuprofen (MOTRIN) tablet   - Route: Oral   - Start Date: February 10, 2022   - End Date: None   - Status: Active    buPROPion (WELLBUTRIN SR) 12 hr tablet   - Route:   - Start Date: February 10, 2022   - End Date: None   - Status: Active    metoprolol succinate (TOPROL-XL) 24 hr tablet   - Route: Oral   - Start Date: December 15, 2022   - End Date: None   - Status: Active    HYDROcodone-acetaminophen (NORCO) tablet 5-325 mg   - Route:   - Start Date: December 15, 2022   - End Date: None   - Status: Active

## 2023-08-17 NOTE — EXTERNAL PATIENT INSTRUCTIONS
Diagnosis   Urinary tract infection (UTI)   My name is HIRAL Santos. I'm a healthcare provider at [facility]. After reviewing your interview, I see you have a urinary tract infection (UTI).   Medications   Your pharmacy   [facility] [phone] [id number] [phone]     Prescription   Nitrofurantoin monohydrate/macrocrystalline (100mg): Take 1 capsule by mouth every 12 hours for 5 days for infection. This medication is an antibiotic. Take it exactly as directed. You must finish the entire course of medication, even if you feel better after taking the first few doses.   About your diagnosis   A UTI is an infection of one or more parts of the urinary tract, most commonly the bladder.   Most UTIs are caused by bacteria (usually E. coli) that travel up the urethra and into the bladder. I see that you have some common signs and symptoms of a UTI:    Pain or burning while urinating    Symptoms that feel a lot like past UTIs    Mild or moderate pain, pressure, or discomfort in your lower abdomen    Strange or strong smelling urine    Symptoms that began shortly after sexual intercourse   Fortunately, most UTIs aren't serious, and they're easily treated with antibiotics. Make sure you take all of the antibiotic pills given to you, even if you start to feel better after the first few doses. Otherwise, the UTI might come back.   What to expect   If you follow this treatment plan, you should start to feel better within 1 to 2 days.   When to seek care   Call us at [phone]   with any sudden or unexpected symptoms.    Symptoms that don't improve or get worse in the next 48 hours    Fever that goes above 101F or lasts longer than 24 hours    Shaking or chills    Nausea or vomiting    Severe flank pain (pain in your back or side)   Other treatment    Rest and drink plenty of water    Urinate frequently and when you first feel the urge    Place a heating pad on your back or stomach to  help relieve some of the discomfort   Prevention    Drink a lot of liquids to help flush bacteria from your system. Water is best. Try for six to eight, 8-ounce glasses a day on a regular basis.    Urinate often and when you first feel the urge. Bacteria can grow when urine stays in the bladder too long. Urinate after sex to flush away bacteria.    After using the toilet, always wipe from front to back. This step is most important after a bowel movement. Wiping from front to back prevents bacteria normally found in stool from entering the urinary tract.   Your provider   Your diagnosis was provided by HIRAL Santos, a member of your trusted care team at [facility].   If you have any questions, call us at [phone]  .

## 2023-08-26 DIAGNOSIS — R00.2 HEART PALPITATIONS: ICD-10-CM

## 2023-08-28 RX ORDER — METOPROLOL SUCCINATE 25 MG/1
25 TABLET, EXTENDED RELEASE ORAL DAILY
Qty: 90 TABLET | Refills: 0 | Status: SHIPPED | OUTPATIENT
Start: 2023-08-28

## 2023-09-07 ENCOUNTER — E-VISIT (OUTPATIENT)
Dept: FAMILY MEDICINE CLINIC | Facility: TELEHEALTH | Age: 28
End: 2023-09-07
Payer: COMMERCIAL

## 2023-09-07 NOTE — EXTERNAL PATIENT INSTRUCTIONS
[image:  data:image/svg+xml;base64,CCH7CyEqVP5qv1Gio3CaMBZ2mo0lb4HjDhBgzVCled9uz6Lzp9RnWSH6bm7ze9SrOlI4sPvqmx8ceLW1bTxnZ7v2wj43Kg9dflpaJvDqNH3omklwWFj4GsJqnORjqW3eZNM5IpK0gDR3Lc78IBIlSWYkYlKpJREmOEmstCvjzADvUQ2yeVKsh2ZvahKoBuiORPAyPKvgGW3yVsVeTqiwIRdqwNyljBojK1RzdMSaUQ8+FzZfBSV1UgLzoVPzhd0bw9Bbz6JxJJX9ka4wh2OyGFN2ZaGjDU1bgQbtiB7rxa48gQI+CwsqIILpJPNbYErgXB6tYiMpHprfNWgyzXdvoHukCjFdy6oqh2NtFGAzGB1ACLDiBBVzMOW6yOU4bJTtuHXjbh0qc7Fgk1BzIWC1ag6ke4KwDSO2DeHzSG8ohZenkCUrHkdvyE2zY8SfYwZpLkTbq3Yxn5wkWWJiOUDjXUXrHiTxqPBwg0OmtEo4QTMcgC2yhF8rDAJvAXL3Xr57Zf6yWPJDZCwaQLnaRVN9RSRdIqGaZatlEhDuJbKsBVslJg70GbZnXNukQJZvACooMXdgAuJkLv73oW6xTP21JCRrEoIhYVPtXVwlSLAaOnfgRI17SFJfAsxjWaTdSiUbFb55RCDcQeEvHjTbYLayTEG1ZrUgDSlcDzazpJZ5gM6WYNHoFSIpMCW7XU2uKRH7TpNyGSFbawqgeSAmOJKdH4kyab86xiZrLS1+CfamZBXqXYYlMVtiUQ9pZpOlHyumQEyfgWkotFawSX65bcRvH3GqbODmSI1iBnkiQRWtEGQgUQeaDGYpMCFmDDRwSD7bpruuAWYoIM2wjSGnGhYxHfjqUOTnP6k0ZFDnpACfY9OaeQYaQvfUNELsIKEcEQYpPAVuYDBaLJIidFPemNAaGPOsF9e8WXCkg0NrGBGgX7m7WTUrPQ5AHWRiXDKcICPeDLYhILNouZiyp7IaH7nnt6O1DjBxUPQ3Hk2jwsTxkk94AZ4eeSSwcKOvsuaulEBxQG74AQDhW9b9BxOtkC3tPXXhDKNiG8c5PiDyDxZyTCA4FKLvUdLeJYZ5EUHvSsXhVirtTFgskOUcSE0LWLNlVRXdBZXxDWPeCHLyHBYanCYojWHmQMEeV6g1MNEzn9GjVAOwA9m1XOShLL0SAiDjUGPdVTLzLWPsJVnbTU0sAoHwChujKJfohThhiJcnRZ72FIwsrJYvvGBsJK3zSwdsKMAlKZCtUOBbSHF2ZMubsGFkHJZulOZyqe6ra7Rgv1TgNGW0tq1xo4HbTFK3DgHeWG2erWbsqW1xv0SwdJqbMFvuMlYizY6jBDFdFpIdT7x9WfWzOfSgSIW1LNNrDgExMVU9LWGuBoNnApwdIRyrzHUwMZ6YPGLfAHQwAUEqINCnTDWnOFGzsUZkeZTyTOChD5v0OUYms7JqRH7sJHZdFMgoVX8+NcuiRVRbKIKyXUTfSHZ8VF1dNQL7QvNlSFHqxmvvcJOaTQSovIGfmQXocPsuRD2+YpAkQKPaQPJrHEPnQXuthJlirUIsAAMrJZGcULSaXU5rqtghDTXaGB8frNOgXaApLjcbTEPsD3x9CZFuzB1yZIY3AzVtnF9bFthqSUClW5u4FhGyUuJfANK2RYEmVsJlFML4IAIqOhXhBnjwCZbpnJFwOX9IIGUcVBQcPDLoZPRkVDChALYliKEkrYUkWUJlN2p8IYPzf6CpLDliWkNqKV2yWwlMZOIeZWHqYHU9F1p+MiSoXUBjOESlYFOaZXTnuCMzqJCqWZXrP8v9FSClb1LwOFzwex47uLtwRR2+TyqpIKKxHC7rWcevSAPoVXPbUBQkfCIzgJSsCCIcI3n9FJRwrh48mLOpRM3ZEtGeSDE6UR1pCNF3UtWdKCVkzROvgUrzZ2HfrUXgAT9+CiAg 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]   Note   I have prescribed Citalopram and have also put in a referral for behavior health for counselling and therapy. If your symptoms do not improve or become worse, follow up with your doctor or go to ED.   [image:  data:image/svg+xml;base64,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 QNQcBB36NHYnRVD6WrL7ZablWRMiVzX6VRNkGHKhFnWhMONsMmY8Yj56Z1BdxGz+CqlnNFerQIOvOYuaRPXef9HsVqYguAWbnf8fv6Bsm5ZwETA6vu9xpCRtj2PurXpmjMAaTOO3Xy3jUB84LJQ2LZWcQqIxYxzyLJ91NGPgSUFmVpQhDW9gKNHpSHCvXf38E3PvwCm+JppaEUxiIJJbHYhdKFWdHPheCwCavHNyxe8hj4Fzb3VcNDS3xy0xaFdfAEuteWTiKKV4ew5kAW47hRMiQTSwCP8hABMrQVT6VbBbGCQnKNIqLB9rOpGkXE38CJCpRGXvMMGoWm48YYE6TGQ8VAS7HlRhCAfuMlZaGEDsHX8xPaVsDL05XAAiSlK1SY22ENJtZPZ8YsTcUETlEzE6Ug22ExXeHF93RqmuYJ1kGOZaUmplKW39TjVnXmekKAmnEZSoDp50AYB2TQBwJAO2uzV+NI3bYSJkOze8V0A0Rv7=]   Diagnosis   Depression   My name is HIRAL Sandoval, [facility]. I'm a healthcare provider at [facility]. I've reviewed your interview, and I see that you have some common symptoms of depression. I'm glad you reached out.   Depression is the most common mental health condition worldwide. In the [place], about 1 in 5 people will experience clinical depression in their lifetime. Fortunately, effective treatments are available. The sooner you start treatment, the better it works.   Treatment for depression can include counseling, coaching, consultations, antidepressant medications, or various digital tools. In creating your treatment plan, I've considered your symptoms, current situation, medical history, and previous treatments, if any.    Please follow up with your provider in a few weeks. They'll check how you're doing and adjust your treatment plan if necessary.   In addition to your prescribed treatment, there are things you can do to help yourself feel better. Depression can lead you to avoid doing tasks, activities, and being with others. Taking action can help break the cycle of avoidance. Even small actions and lifestyle changes can make a big difference. Try some of the suggestions in the Other treatment section below.   [image:  data:image/svg+xml;base64,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]   Medications   Your pharmacy   [facility] [phone] [address] [phone]     Prescription   Citalopram (20mg): Take  1 tablet by mouth once a day for mood disorder. You have 2 refills for this prescription. Common brand names include Celexa.   [image:  data:image/svg+xml;base64,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]   Orders and referrals   I've included a referral for therapy in your treatment plan. Someone will contact you to schedule an appointment for counseling or therapy.   [image:  data:image/svg+xml;base64,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]   About your diagnosis   Depression is different from ordinary sadness. When you're sad or going through normal grief, the feelings may come and go, and then fade over time. Depression causes long-standing symptoms that affect your ability to go about your daily life.   It's a health condition that affects how you think and function, and can even affect your self-esteem. Sometimes depression  can also cause physical symptoms such as headaches and stomach pains.   Common symptoms of depression include:    Feeling sad, hopeless, discouraged, or down    Loss of interest or pleasure in previously enjoyable activities    Appetite or weight changes    Sleep disturbances: sleeping too much or too little    Either restlessness or sluggishness    Loss of energy    Excessive guilt    Feelings of worthlessness    Difficulty concentrating    Recurrent thoughts of death or suicide   [image:  data:image/svg+xml;base64,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]   What to  expect   Antidepressant medications take time to work. Many people start to feel better within 2 weeks. But others may not notice improvement until after 4 weeks of treatment. After that, it can take 6 to 12 weeks before the medication has its full effect.   Common side effects of antidepressant medications include:    Dry mouth    Dizziness    Drowsiness    Jitteriness    Nausea, vomiting, or diarrhea    Sexual problems    Weight gain   Many of these side effects will fade after a few days or weeks of use. If any of the side effects are very bothersome or uncomfortable, please let us know. We may be able to change the dose or switch to a different medication. Finding the right medication and the right dose often takes some trial and error.   However, medication should not make your symptoms worse. Contact us immediately if your mood symptoms get worse or if you notice any of the symptoms in the When to seek care section below.   Never stop an antidepressant medicine without first talking to a healthcare provider. Suddenly stopping this type of medication can cause withdrawal symptoms.   Counseling and talk therapy   Counseling or therapy teaches you new coping skills and more adaptive ways of thinking about problems. These tools can help you make positive changes. The benefits of counseling often last long after treatment sessions have stopped.   [image:  data:image/svg+xml;base64,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]   When to seek care   If you feel like harming yourself or others, call 911 right away.   The [facility] is also available. You can call [phone]   to speak with a counselor at the lifeline, or you can connect with one using their online chat  .   Call us at [phone]   with any sudden or unexpected symptoms.    Worsening depression symptoms    New or worsening anxiety symptoms    Feeling extremely agitated or restless    Panic attacks    Worsening insomnia    New or worsening irritability    Inappropriate aggression, anger, or violence    Dangerous impulses    Extreme increase in activity or talking    Other unusual changes in behavior, mood, thoughts, or feeling    Uncomfortable side effects of new medications    No improvement at all after 2 to 4 weeks on the new medication   [image:  data:image/svg+xml;base64,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]   Other treatment   The tips below may help you feel better while you start your treatment plan:   Self-care    Depression can make self-care hard, but taking action can help you get better. So start where you are and set small goals. These can be simple: get out of bed, take a shower, get dressed, prepare a meal.    Make a list of activities that usually improve your mood. When you're feeling down, try doing one of those activities, even for a few minutes.    Be kind to yourself. Don't get down on yourself if you don't reach a goal. Be willing to try again.    Try to eat on a regular schedule. Blood sugar levels can affect mood.   Exercise    Physical exercise has an especially positive effect on mood. If you're able to, try walking 30 minutes a day, 3 to 5 times a week. If that sounds like too much, challenge yourself to start walking for just 10 minutes a day. If walking is not for you, find another activity. Any kind of physical activity helps. The best exercise is the kind you enjoy and will actually do.   Improve your sleep   Getting better sleep is one of the best things you can do to improve your symptoms.    " Caffeine, tobacco, and alcohol can cause interrupted sleep. Cutting down or quitting these can improve the quality of your sleep. If you can't quit caffeine completely, try avoiding it later in the day.    Set a regular bedtime, and allow a period of time to \"unwind\" before going to sleep.    Wake up at the same time every day.    Turn off or put away all electronic devices an hour before going to sleep.    Avoid reading, watching TV, or using electronic devices in bed.    As much as possible, keep your bedroom dark, cool, and quiet.    If you're struggling to sleep, don't stay in bed. Get up and go to a quiet spot. Read or do relaxation exercises. Then go back to bed and try again.   Try mindfulness exercises    If your mind races, focus on your body instead. Breathe in slowly through your nose and out through your mouth.    Some people find that meditation helps with mood symptoms. If you want to try meditation but don't know how, mobile apps can get you started.   Use your creativity    When you have depression, you can often spend too much time thinking. Making something with your hands can use your thoughts in a positive way and bring some relief. It also helps you move from inaction to action. Activities like writing in a journal, gardening, woodworking, cooking, or doing a craft can help focus your mind.   Connect with others    If you can't meet in person, send a short text or email to someone just to keep in touch.    If you use social media, notice how it makes you feel. If certain topics or people have a negative effect on your mood, unfollow them. Limit the time you spend on social media. Active participation can be better than passive scrolling through a feed.    If you're up to it, try volunteering. Or just do something kind for someone. This can lift your mood as well as theirs.   [image: " data:image/svg+xml;base64,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]   Your provider   Your diagnosis was provided by HIRAL Sandoval, SERGIOP-BC, a member of your trusted care team at [facility].   If you have any questions, call us at [phone]  .

## 2023-09-07 NOTE — E-VISIT TREATED
Chief Complaint: Anxiety, Depression, Stress   Patient introduction   Patient is 28-year-old female presenting with mood symptoms. Patient has had current symptoms for more than a year. Has had recent unusual stress relating to work.   Patient-submitted comments explaining reason for visit: Would take too long to get in person visit. .   Patient is willing to try medication as part of their treatment plan.   Patient did not request an excuse note.   Depression screening   PHQ-9. Response options are: Not at all (0), On several days (1), More than half the days (2), or Nearly every day (3).   Over the past 2 weeks, patient has been bothered:    (1) On several days by having little interest or pleasure in doing things    (1) On several days by depressed mood    (0) Not at all by sleep disturbance    (3) Nearly every day by fatigue or lethargy    (3) Nearly every day by change in appetite    (0) Not at all by feelings of worthlessness or excessive guilt    (0) Not at all by poor concentration    (0) Not at all by observable restlessness or slowness in movement    (0) Not at all by thoughts of hurting themselves or that they would be better off dead   The above problems have made it very difficult to work, function at home, or get along with other people.   Score: 8. Interpretation: 0 to 4: None to minimal. 5 to 9: Mild depression. 10 to 14: Major Depressive Disorder, Mild. 15 to 19: Major Depressive Disorder, Moderately Severe. 20 to 27: Major Depressive Disorder, Severe.   Anxiety screening   NIRAJ-7. Response options are: Not at all (0), On several days (1), More than half the days (2), or Nearly every day (3)   Over the past 2 weeks, patient has been bothered:    (2) On more than half the days by feeling nervous, anxious, or on edge    (1) On several days by not being able to stop or control worrying    (1) On several days by worrying too much about different things    (1) On several days by having trouble  relaxing    (1) On several days by being so restless that it is hard to sit still    (2) On more than half the days by becoming easily annoyed or irritable    (0) Not at all by feeling afraid, as if something awful might happen   The above problems have made it very difficult to work, function at home, or get along with other people.   Score: 8. Interpretation: 0 to 4: None to minimal. 5 to 9: Mild anxiety. 10 to 14: Moderate anxiety. 15 to 21: Severe anxiety.   Suicide risk screening   Score: Negative screen (based on PHQ-9 responses above).   Action taken based on risk:    Negative screen: Patient completed interview.    Low risk: Patient completed interview. Follow-up per provider discretion.    Moderate risk: Recommended to call [phone] or [phone] or to go to their nearest ER. Patient given option to continue with the interview if those options are not relevant at this time. Follow-up per provider discretion.    High risk: Interview terminated. Recommended to go to ER or to call 911 or [phone].   Repetitive thoughts and behaviors screening   DSM-5 Level 1 Cross-Cutting Symptom Measure, Section X. 2 items. Response options are: Not at all (0), Rarely (1), Several days (2), More than half the days (3), or Nearly every day (4)   Over the past 2 weeks, patient has been bothered:    (0) Not at all by unpleasant thoughts, urges, or images that repeatedly enter their mind    (0) Not at all by feeling driven to repeat certain behaviors or mental acts   Score: 0. Interpretation: 0 to 2 (with 0 to 1 on both items): Negative screen. 2 or higher (with 2 or higher on either item): Positive screen.   Mirian/hypomania screening   DSM-5 Level 1 Cross-Cutting Symptom Measure, Section III. 2 items. Response options are: Not at all (0), Rarely (1), Several days (2), More than half the days (3), or Nearly every day (4)   Over the past 2 weeks, patient has been bothered:    (0) Not at all by sleeping less than usual, but still having a lot of  energy    (0) Not at all by starting lots more projects than usual or doing more risky things than usual   Score: 0. Interpretation: 0 to 2 (with 1 on both items): Negative screen. 2 or higher (with 2 or higher on at least 1 item): Positive screen; in-interview follow-up with Lorenzo Self-Rating Mirian (ASRM) Scale.   Psychosis/hallucination screening   DSM-5 Level 1 Cross-Cutting Symptom Measure, Section VII. 2 items. Response options are: Not at all (0), Rarely (1), Several days (2), More than half the days (3), or Nearly every day (4)   Over the past 2 weeks, patient has been bothered:    (0) Not at all by hearing things other people could not hear    (0) Not at all by feeling that someone could hear their thoughts   Score: 0. Interpretation: 0: Negative screen. 1 or higher: Positive screen.   Substance abuse screening   DSM-5 Level 1 Cross-Cutting Symptom Measure, Section XIII. 2 items on use of tobacco, recreational drugs, or prescription medications beyond the amount prescribed or duration of prescription.   Over the past 2 weeks, patient:    (0) Did not use tobacco    (0) Did not use a recreational or prescription drug on their own   Score: 0. Interpretation: 0 is a negative screen. 1 or higher with positive response for prescription/recreational drug abuse leads to follow-up with Level 2 Cross-Cutting Symptom Measure, Section XIII. 1 or higher with positive response for tobacco use leads to tobacco cessation advice in AVS.   AUDIT-C. 3 items, shown if alcohol use reported above.   During the past year, patient:    (1) Had an alcoholic drink monthly or less    (0) Had 1 to 2 alcoholic drinks on a typical day when they were drinking    (0) Did not have 6 or more drinks on one occasion   Score: 1. Interpretation: A score of 3 or higher in women is a positive screen for unhealthy drinking.   Comorbid/Exacerbating conditions   No history of asthma, cancer, chronic pain, congestive heart failure, coronary artery  disease, diabetes, epilepsy, hypertension, inflammatory arthritis, kidney disease or history of kindey function problems, lupus, multiple sclerosis, Parkinson disease, thyroid disorder, or viral hepatitis.   Past mental health history   Previous diagnosis of depression, generalized anxiety disorder, and PTSD. Regarding month and year of first depression diagnosis, patient writes: 11/2011. Since initial depression diagnosis, patient has not had a period when symptoms resolved and they did not need medication.   Family history of mental health disorders   First-degree relative(s) with a history of depression, generalized anxiety disorder, panic attacks, bipolar disorder, and substance use disorder. Regarding medication taken by first-degree relative(s), patient writes: unanswered.   Current mental health treatment   Patient is not currently taking medication for any mental health condition. Patient is not currently in counseling or therapy. Patient is not currently being seen by a psychiatrist and has not been seen by one in the last 2 years.   Previous mental health treatment   Has taken citalopram in the past.   As to effectiveness of past treatment:   Patient was satisfied with citalopram. Patient wants to refill citalopram.   Vital signs    Height: 165 centimeters    Weight: 47.6 kilograms   Current medications   Currently taking metoprolol succinate XL 25 MG 24 hr tablet.   Medication allergies   None.   Medication contraindications   None.   Assessment   Mild depression.   This diagnosis is based on review of patient interview responses and other available clinical information.    PHQ-9 depression screening score: 8. Interpretation: 5 to 9: Mild depression.    NIRAJ-7 generalized anxiety screening score: 8. Interpretation: 5 to 9: Mild anxiety.   Suicide risk severity screening was negative.   Screening results show low likelihood of archana/hypomania and psychosis.   Plan   Medications:    citalopram 20 mg tablet  RX 20mg 1 tab PO qd 30d for mood disorder. Amount is 30 tab. Refill amount is 2.   The patient's prescription will be sent to:   [facility] [phone] [id number]   Phone: [phone]     Fax: [phone]   Orders:    Referral to behavioral health.   Education:    Condition and causes    Treatment and self-care    Possible medication side effects    When to call provider   ----------   Electronically signed by HIRAL Sandoval FNP-BC on 2023-09-07 at 05:14AM   ----------   Patient Interview Transcript:   Have you ever been diagnosed with any of these mental health conditions? Select all that apply.    Depression    Generalized anxiety disorder (NIRAJ)    Post traumatic stress disorder (PTSD)   Not selected:    Panic attacks    Obsessive-compulsive disorder (OCD)    Bipolar disorder    Schizophrenia or schizoaffective disorder    A mental health condition not listed here (specify)    None of the above   When were you first diagnosed with depression? Please specify the month and year, or your best estimate, as MM/YYYY.    11/2011   Since you were first diagnosed with depression, has there been a time when your symptoms went away completely and you didn't need to take medication? Select one.    No   Not selected:    Yes   Are you currently taking medication for any mental health condition? Select one.    No   Not selected:    Yes   Have you taken medication for any mental health condition in the past? Select one.    Yes   Not selected:    No   Which medications have you taken in the past for your mental health condition(s)? Select all that apply.    Celexa (citalopram)   Not selected:    Atarax or Vistaril (hydroxyzine)    BuSpar (buspirone)    Cymbalta or Drizalma Sprinkle (duloxetine)    Effexor or Effexor XR (venlafaxine)    Lexapro (escitalopram)    Paxil, Paxil CR, or Pexeva (paroxetine)    Pristiq (desvenlafaxine)    Prozac (fluoxetine)    Remeron (mirtazapine)    Trazodone     Wellbutrin SR, Wellbutrin XL, Forfivo XL, or Aplenzin (bupropion)    Zoloft (sertraline)    Other (specify medication and whether you were satisfied with it)   I'm satisfied with Celexa (citalopram)    Yes   Not selected:    No   I want to refill/restart    Yes   Not selected:    No   Have you recently experienced unusual stress from any of these? Select all that apply.    Work   Not selected:    Personal relationships    Home situation    Family    Finances    Something related to COVID-19    Current news and events    None of the above   Are you currently in counseling or therapy? Select one.    No   Not selected:    Yes   Are you currently being seen by a psychiatrist, or have you been seen by a psychiatrist in the last 2 years? Select one.    No   Not selected:    Yes, currently    Yes, within the last 2 years   Do any of these apply to you? Select all that apply.    None of the above   Not selected:    I'm pregnant    I've given birth in the past 12 months    I'm breastfeeding   Do you have any of these medical conditions? Scroll to see all options. Select all that apply.    None of the above   Not selected:    Asthma    Cancer    Chronic pain    Congestive heart failure    Coronary artery disease (blocked arteries in the heart)    Diabetes    Epilepsy    High blood pressure    Inflammatory arthritis    Kidney disease or history of kidney function problems    Lupus (SLE)    Multiple sclerosis    Parkinson disease    Thyroid disorder    Viral hepatitis   Do any of these apply to your first-degree blood relatives? First-degree blood relatives include parents, siblings, and children who you're related to by birth, not by marriage or adoption. Select all that apply.    Depression    Generalized anxiety disorder (NIRAJ)    Panic attacks    Bipolar disorder    Drug or alcohol addiction (substance use disorder)   Not selected:    Post traumatic stress disorder (PTSD)    Obsessive-compulsive disorder (OCD)     Schizophrenia or schizoaffective disorder     by suicide    Attempted suicide    No, not that I know of   Are any of your first-degree blood relatives taking medications for their condition? Select one.    Yes   Not selected:    No    I'm not sure   Genetics often play a role in how well medications work for mental health conditions. For example, if your sister with depression did well on sertraline (Zoloft), then it's likely that sertraline would work well for you, too. If you know the name of the medication your family member takes for their mental health condition, list it here. If not, click Next.   The patient did not enter any additional information.   1. Over the past 2 weeks, how often have you been bothered by: Having little interest or pleasure in doing things Select one.    Several days   Not selected:    Not at all    More than half the days    Nearly every day   2. Over the past 2 weeks, how often have you been bothered by: Feeling down, depressed, or hopeless Select one.    Several days   Not selected:    Not at all    More than half the days    Nearly every day   3. Over the past 2 weeks, how often have you been bothered by: Trouble falling or staying asleep, or sleeping too much Select one.    Not at all   Not selected:    Several days    More than half the days    Nearly every day   4. Over the past 2 weeks, how often have you been bothered by: Feeling tired or having little energy Select one.    Nearly every day   Not selected:    Not at all    Several days    More than half the days   5. Over the past 2 weeks, how often have you been bothered by: Poor appetite or overeating Select one.    Nearly every day   Not selected:    Not at all    Several days    More than half the days   6. Over the past 2 weeks, how often have you been bothered by: Feeling bad about yourself, that you're a failure, or that you've let yourself or friends and family down Select one.    Not at all   Not selected:     Several days    More than half the days    Nearly every day   7. Over the past 2 weeks, how often have you been bothered by: Trouble concentrating on things like watching TV or reading the news Select one.    Not at all   Not selected:    Several days    More than half the days    Nearly every day   8. Over the past 2 weeks, how often have you been bothered by: Moving or speaking so slowly that other people could have noticed OR Being so fidgety or restless that you have been moving around a lot more than usual Select one.    Not at all   Not selected:    Several days    More than half the days    Nearly every day   9. Over the past 2 weeks, how often have you been bothered by: Thoughts that you'd be better off dead or thoughts of hurting yourself Select one.    Not at all   Not selected:    Several days    More than half the days    Nearly every day   How difficult have these problems made it for you to work, take care of things at home, or get along with other people? Select one.    Very difficult   Not selected:    Not difficult at all    Somewhat difficult    Extremely difficult   1. Over the past 2 weeks, how often have you been bothered by: Feeling nervous, anxious, or on edge? Select one.    More than half the days   Not selected:    Not at all    Several days    Nearly every day   2. Over the past 2 weeks, how often have you been bothered by: Not being able to stop or control worrying? Select one.    Several days   Not selected:    Not at all    More than half the days    Nearly every day   3. Over the past 2 weeks, how often have you been bothered by: Worrying too much about different things? Select one.    Several days   Not selected:    Not at all    More than half the days    Nearly every day   4. Over the past 2 weeks, how often have you been bothered by: Having trouble relaxing? Select one.    Several days   Not selected:    Not at all    More than half the days    Nearly every day   5. Over the past  2 weeks, how often have you been bothered by: Being so restless that it's hard to sit still? Select one.    Several days   Not selected:    Not at all    More than half the days    Nearly every day   6. Over the past 2 weeks, how often have you been bothered by: Becoming easily annoyed or irritable? Select one.    More than half the days   Not selected:    Not at all    Several days    Nearly every day   7. Over the past 2 weeks, how often have you been bothered by: Feeling afraid, as if something awful might happen? Select one.    Not at all   Not selected:    Several days    More than half the days    Nearly every day   How difficult have these symptoms made it for you to do your work, take care of things at home, or get along with other people? Select one.    Very difficult   Not selected:    Not difficult at all    Somewhat difficult    Extremely difficult   Over the past 2 weeks, how often have you been bothered by: Sleeping less than usual, but still having a lot of energy? Select one.    Not at all   Not selected:    1 to 2 days    Several days    More than half the days    Nearly every day   Over the past 2 weeks, how often have you been bothered by: Starting lots more projects than usual or doing more risky things than usual? Select one.    Not at all   Not selected:    1 to 2 days    Several days    More than half the days    Nearly every day   Over the past 2 weeks, how often have you been bothered by: Hearing things other people couldn't hear, such as voices even when no one was around? Select one.    Not at all   Not selected:    1 to 2 days    Several days    More than half the days    Nearly every day   Over the past 2 weeks, how often have you been bothered by: Feeling that someone could hear your thoughts, or that you could hear what another person was thinking? Select one.    Not at all   Not selected:    1 to 2 days    Several days    More than half the days    Nearly every day   Over the past 2  "weeks, how often have you been bothered by: Unpleasant thoughts, urges, or images that repeatedly enter your mind? Select one.    Not at all   Not selected:    1 to 2 days    Several days    More than half the days    Nearly every day   Over the past 2 weeks, how often have you been bothered by: Feeling driven to perform certain behaviors or mental acts over and over again? Select one.    Not at all   Not selected:    1 to 2 days    Several days    More than half the days    Nearly every day   In the past year, how often did you have a drink containing alcohol? Select one.    Monthly or less   Not selected:    Never    2 to 4 times per month    2 to 3 times per week    4 or more times per week   In the past year, how many drinks did you have on a typical day when you were drinking? Select one.    1 or 2   Not selected:    3 or 4    5 or 6    7 to 9    10 or more   In the past year, how often did you have 6 or more drinks on one occasion? Select one.    Never   Not selected:    Less than monthly    Monthly    Weekly    Daily or almost daily   Over the past 2 weeks, how often have you: Smoked any cigarettes, smoked a cigar or pipe, or used snuff or chewing tobacco? Select one.    Not at all   Not selected:    1 to 2 days    Several days    More than half the days    Nearly every day   Over the past 2 weeks, how often did you use any of these on your own? \"On your own\" means without a doctor's prescription, or more than prescribed, or longer than prescribed. - Prescription painkillers, such as Vicodin - Stimulants, such as Ritalin or Adderall - Sedatives or tranquilizers, such as sleeping pills or Valium - Marijuana - Cocaine or crack - Club drugs, such as Ecstasy - Hallucinogens, such as LSD - Heroin - Inhalants or solvents, such as glue - Methamphetamines, such as speed Select one.    Not at all   Not selected:    1 to 2 days    Several days    More than half the days    Nearly every day   Think about all of the " "symptoms you've shared with us today. How long have you been feeling this way? Select one.    More than a year   Not selected:    Less than a year    I'm not sure   These last few questions help us make sure your treatment plan is safe for you. Do you have any of these conditions? Select all that apply.    None of these   Not selected:    Uncorrected or persistent electrolyte abnormalities, such as potassium, sodium, calcium or magnesium    QT prolongation    Congenital long QT syndrome (LQTS)    Ventricular arrhythmias, such as ventricular fibrillation or ventricular tachycardia    Bradycardia (low heart rate)    Recent heart attack    Congestive heart failure (CHF)   Do any of these apply to you now or in the recent past? \"Cold turkey\" here means stopping a medication suddenly rather than slowly taking lower and lower doses until you're off the medication. Select all that apply.    None of these   Not selected:    Seizure disorder    Bulimia or anorexia    Liver disease    Alcohol abuse    Stopped using alcohol \"cold turkey\"    Stopped using a sedative \"cold turkey\"    Stopped using an anti-seizure drug \"cold turkey\"    Stopped using a benzodiazepine drug (Klonopin, Valium, Ativan, Xanax) \"cold turkey\"   Do any of the following apply to you? Select all that apply.    None of these   Not selected:    I'm currently taking pimozide    I'm currently taking thioridazine    I've taken an MAO inhibitor in the last 14 days    I've taken linezolid or IV methylene blue in the last 14 days   Are you still taking these medications listed in your medical record? If you're not taking any of these, click Next. Select all that apply.    metoprolol succinate XL 25 MG 24 hr tablet   Are you taking any other medications, vitamins, or supplements? Select one.    No   Not selected:    Yes   Have you ever had an allergic or bad reaction to any medication? Select one.    No   Not selected:    Yes   If medication is recommended as part " of your treatment plan, is that something you're willing to try? Select one.    Yes   Not selected:    No   Knowing your Body Mass Index (BMI) can help your provider choose the best medication for you. To determine your BMI, we need to know your height and weight. Enter your height.    Height   Enter your weight (in pounds).    Weight   Do you need a doctor's note? A doctor's note confirms that you received care today and states when you can return to school or work. It does not contain information about your diagnosis or treatment plan. Your provider will make the final decision on whether to give you a doctor's note. Doctor's notes CANNOT be backdated. Select one.    No   Not selected:    Today only (1 day)    Today and tomorrow (2 days)    3 days   What is the main reason you're taking this interview today?    Would take too long to get in person visit.   ----------   Medical history   Medical history data does not currently exist for this patient.

## 2023-09-27 ENCOUNTER — OFFICE VISIT (OUTPATIENT)
Dept: FAMILY MEDICINE CLINIC | Facility: CLINIC | Age: 28
End: 2023-09-27
Payer: COMMERCIAL

## 2023-09-27 VITALS
WEIGHT: 105.2 LBS | SYSTOLIC BLOOD PRESSURE: 96 MMHG | TEMPERATURE: 98.4 F | HEART RATE: 70 BPM | HEIGHT: 65 IN | DIASTOLIC BLOOD PRESSURE: 58 MMHG | RESPIRATION RATE: 14 BRPM | OXYGEN SATURATION: 100 % | BODY MASS INDEX: 17.53 KG/M2

## 2023-09-27 DIAGNOSIS — R42 LIGHTHEADEDNESS: ICD-10-CM

## 2023-09-27 DIAGNOSIS — R00.2 HEART PALPITATIONS: Primary | ICD-10-CM

## 2023-09-27 PROCEDURE — 99214 OFFICE O/P EST MOD 30 MIN: CPT | Performed by: FAMILY MEDICINE

## 2023-09-27 RX ORDER — KETOCONAZOLE 20 MG/ML
SHAMPOO TOPICAL 2 TIMES WEEKLY
COMMUNITY
Start: 2023-07-15

## 2023-09-27 RX ORDER — CITALOPRAM 20 MG/1
20 TABLET ORAL DAILY
COMMUNITY
Start: 2023-09-07

## 2023-09-27 RX ORDER — CLOBETASOL PROPIONATE 0.46 MG/ML
SOLUTION TOPICAL 2 TIMES DAILY
COMMUNITY
Start: 2023-07-15

## 2023-09-27 NOTE — PROGRESS NOTES
Chief Complaint  Dizziness (Dizziness, lightheadedness, chest pain last night, had normal EKG 9/13/23 at work.)    Subjective          Salud Bocanegra presents to [facility]  History of Present Illness  She had another episode tachycardia, dizziness, chest pain, most recently occurred last night while at work. She had chest pain on 9/13/23 at work, went to [facility] for evaluation and EKG completed   She previously saw cardiology, Dr. Loaiza and Dr. Campbell. Prior cardiac evaluation includes Holter monitor, stress test, carotid duplex.   She takes metoprolol succinate 25 mg daily.   She is concerned about POTS. Her heart rate varies with changing positions from sitting down to standing up. If she increases fluid intake and sodium intake, it helps some     The following portions of the patient's history were reviewed and updated as appropriate: allergies, current medications, past family history, past medical history, past social history, past surgical history, and problem list.    Objective      Physical Exam  Vitals reviewed.   Cardiovascular:      Rate and Rhythm: Normal rate.      Heart sounds: Normal heart sounds.   Pulmonary:      Effort: Pulmonary effort is normal.      Breath sounds: Normal breath sounds.   Neurological:      Mental Status: She is alert.      Result Review :                Assessment and Plan    Diagnoses and all orders for this visit:    1. Heart palpitations (Primary)  -     Tilt table; Future  -     Ambulatory Referral to Cardiology  -     CBC & Differential  -     Comprehensive Metabolic Panel  -     TSH+Free T4  -     Magnesium    2. Lightheadedness  -     Tilt table; Future  -     Ambulatory Referral to Cardiology  -     CBC & Differential  -     Comprehensive Metabolic Panel  -     TSH+Free T4  -     Magnesium    Labs to evaluate  She would like a consult with different cardiologist, referral placed. Will also evaluate symptoms with tilt table.   She has  filed for FMLA, will complete forms for intermittent FMLA when received.     Follow Up   No follow-ups on file.  Patient was given instructions and counseling regarding her condition or for health maintenance advice. Please see specific information pulled into the AVS if appropriate.

## 2023-09-28 LAB
ALBUMIN SERPL-MCNC: 4.7 G/DL (ref 4–5)
ALBUMIN/GLOB SERPL: 1.8 {RATIO} (ref 1.2–2.2)
ALP SERPL-CCNC: 77 IU/L (ref 44–121)
ALT SERPL-CCNC: 8 IU/L (ref 0–32)
AST SERPL-CCNC: 13 IU/L (ref 0–40)
BASOPHILS # BLD AUTO: 0.1 X10E3/UL (ref 0–0.2)
BASOPHILS NFR BLD AUTO: 1 %
BILIRUB SERPL-MCNC: 0.2 MG/DL (ref 0–1.2)
BUN SERPL-MCNC: 10 MG/DL (ref 6–20)
BUN/CREAT SERPL: 14 (ref 9–23)
CALCIUM SERPL-MCNC: 9.7 MG/DL (ref 8.7–10.2)
CHLORIDE SERPL-SCNC: 102 MMOL/L (ref 96–106)
CO2 SERPL-SCNC: 24 MMOL/L (ref 20–29)
CREAT SERPL-MCNC: 0.7 MG/DL (ref 0.57–1)
EGFRCR SERPLBLD CKD-EPI 2021: 119 ML/MIN/1.73
EOSINOPHIL # BLD AUTO: 0.3 X10E3/UL (ref 0–0.4)
EOSINOPHIL NFR BLD AUTO: 4 %
ERYTHROCYTE [DISTWIDTH] IN BLOOD BY AUTOMATED COUNT: 11.7 % (ref 11.7–15.4)
GLOBULIN SER CALC-MCNC: 2.6 G/DL (ref 1.5–4.5)
GLUCOSE SERPL-MCNC: 88 MG/DL (ref 70–99)
HCT VFR BLD AUTO: 41.8 % (ref 34–46.6)
HGB BLD-MCNC: 13.6 G/DL (ref 11.1–15.9)
IMM GRANULOCYTES # BLD AUTO: 0 X10E3/UL (ref 0–0.1)
IMM GRANULOCYTES NFR BLD AUTO: 0 %
LYMPHOCYTES # BLD AUTO: 2.1 X10E3/UL (ref 0.7–3.1)
LYMPHOCYTES NFR BLD AUTO: 33 %
MAGNESIUM SERPL-MCNC: 2 MG/DL (ref 1.6–2.3)
MCH RBC QN AUTO: 30.3 PG (ref 26.6–33)
MCHC RBC AUTO-ENTMCNC: 32.5 G/DL (ref 31.5–35.7)
MCV RBC AUTO: 93 FL (ref 79–97)
MONOCYTES # BLD AUTO: 0.3 X10E3/UL (ref 0.1–0.9)
MONOCYTES NFR BLD AUTO: 5 %
NEUTROPHILS # BLD AUTO: 3.5 X10E3/UL (ref 1.4–7)
NEUTROPHILS NFR BLD AUTO: 57 %
PLATELET # BLD AUTO: 223 X10E3/UL (ref 150–450)
POTASSIUM SERPL-SCNC: 4.1 MMOL/L (ref 3.5–5.2)
PROT SERPL-MCNC: 7.3 G/DL (ref 6–8.5)
RBC # BLD AUTO: 4.49 X10E6/UL (ref 3.77–5.28)
SODIUM SERPL-SCNC: 140 MMOL/L (ref 134–144)
T4 FREE SERPL-MCNC: 1.11 NG/DL (ref 0.82–1.77)
TSH SERPL DL<=0.005 MIU/L-ACNC: 0.5 UIU/ML (ref 0.45–4.5)
WBC # BLD AUTO: 6.2 X10E3/UL (ref 3.4–10.8)

## 2023-10-25 ENCOUNTER — HOSPITAL ENCOUNTER (EMERGENCY)
Age: 28
Discharge: HOME | End: 2023-10-25
Payer: COMMERCIAL

## 2023-10-25 VITALS
TEMPERATURE: 97.9 F | HEART RATE: 78 BPM | SYSTOLIC BLOOD PRESSURE: 99 MMHG | RESPIRATION RATE: 17 BRPM | DIASTOLIC BLOOD PRESSURE: 64 MMHG | OXYGEN SATURATION: 98 %

## 2023-10-25 VITALS — SYSTOLIC BLOOD PRESSURE: 100 MMHG | DIASTOLIC BLOOD PRESSURE: 70 MMHG

## 2023-10-25 VITALS — BODY MASS INDEX: 17.4 KG/M2

## 2023-10-25 VITALS
RESPIRATION RATE: 19 BRPM | OXYGEN SATURATION: 99 % | HEART RATE: 77 BPM | SYSTOLIC BLOOD PRESSURE: 97 MMHG | DIASTOLIC BLOOD PRESSURE: 64 MMHG

## 2023-10-25 VITALS — DIASTOLIC BLOOD PRESSURE: 65 MMHG | SYSTOLIC BLOOD PRESSURE: 96 MMHG

## 2023-10-25 VITALS — HEART RATE: 75 BPM

## 2023-10-25 VITALS
SYSTOLIC BLOOD PRESSURE: 118 MMHG | HEART RATE: 75 BPM | OXYGEN SATURATION: 98 % | RESPIRATION RATE: 17 BRPM | DIASTOLIC BLOOD PRESSURE: 72 MMHG | TEMPERATURE: 97.88 F

## 2023-10-25 DIAGNOSIS — R00.2: ICD-10-CM

## 2023-10-25 DIAGNOSIS — F41.1: ICD-10-CM

## 2023-10-25 DIAGNOSIS — R07.9: Primary | ICD-10-CM

## 2023-10-25 LAB
ALBUMIN LEVEL: 4.5 G/DL (ref 3.5–5)
ALBUMIN/GLOB SERPL: 1.7 {RATIO} (ref 1.1–1.8)
ALP ISO SERPL-ACNC: 61 U/L (ref 38–126)
ALT SERPLBLD-CCNC: 18 U/L (ref 12–78)
ANION GAP SERPL CALC-SCNC: 10.8 MEQ/L (ref 5–15)
AST SERPL QL: 27 U/L (ref 14–36)
BILIRUBIN,TOTAL: 0.5 MG/DL (ref 0.2–1.3)
BUN SERPL-MCNC: 11 MG/DL (ref 7–17)
CALCIUM SPEC-MCNC: 8.7 MG/DL (ref 8.4–10.2)
CHLORIDE SPEC-SCNC: 106 MMOL/L (ref 98–107)
CO2 SERPL-SCNC: 26 MMOL/L (ref 22–30)
CREAT BLD-SCNC: 0.6 MG/DL (ref 0.52–1.04)
ESTIMATED GLOMERULAR FILT RATE: 117 ML/MIN (ref 60–?)
GFR (AFRICAN AMERICAN): 142 ML/MIN (ref 60–?)
GLOBULIN SER CALC-MCNC: 2.7 G/DL (ref 1.3–3.2)
GLUCOSE: 106 MG/DL (ref 74–100)
HCT VFR BLD CALC: 40 % (ref 37–47)
HGB BLD-MCNC: 14.3 G/DL (ref 12.2–16.2)
MCHC RBC-ENTMCNC: 35.7 G/DL (ref 31.8–35.4)
MCV RBC: 92.4 FL (ref 81–99)
MEAN CORPUSCULAR HEMOGLOBIN: 33 PG (ref 27–31.2)
PLATELET # BLD: 186 K/MM3 (ref 142–424)
POTASSIUM: 3.8 MMOL/L (ref 3.5–5.1)
PROT SERPL-MCNC: 7.2 G/DL (ref 6.3–8.2)
RBC # BLD AUTO: 4.33 M/MM3 (ref 4.2–5.4)
SODIUM SPEC-SCNC: 139 MMOL/L (ref 136–145)
T4 (THYROXINE): 7.8 UG/DL (ref 5.53–11)
TROPONIN I: < 0.01 NG/ML (ref 0–0.03)
TSH SERPL-ACNC: 0.73 UIU/ML (ref 0.47–4.68)
WBC # BLD AUTO: 5.3 K/MM3 (ref 4.8–10.8)

## 2023-10-25 PROCEDURE — 93005 ELECTROCARDIOGRAM TRACING: CPT

## 2023-10-25 PROCEDURE — 84484 ASSAY OF TROPONIN QUANT: CPT

## 2023-10-25 PROCEDURE — 99283 EMERGENCY DEPT VISIT LOW MDM: CPT

## 2023-10-25 PROCEDURE — 85025 COMPLETE CBC W/AUTO DIFF WBC: CPT

## 2023-10-25 PROCEDURE — 84443 ASSAY THYROID STIM HORMONE: CPT

## 2023-10-25 PROCEDURE — 84702 CHORIONIC GONADOTROPIN TEST: CPT

## 2023-10-25 PROCEDURE — 84436 ASSAY OF TOTAL THYROXINE: CPT

## 2023-10-25 PROCEDURE — 80053 COMPREHEN METABOLIC PANEL: CPT

## 2023-12-14 ENCOUNTER — OFFICE VISIT (OUTPATIENT)
Dept: CARDIOLOGY | Facility: CLINIC | Age: 28
End: 2023-12-14
Payer: COMMERCIAL

## 2023-12-14 VITALS
BODY MASS INDEX: 18.49 KG/M2 | DIASTOLIC BLOOD PRESSURE: 66 MMHG | SYSTOLIC BLOOD PRESSURE: 106 MMHG | OXYGEN SATURATION: 98 % | HEIGHT: 65 IN | HEART RATE: 72 BPM | WEIGHT: 111 LBS

## 2023-12-14 DIAGNOSIS — R00.2 PALPITATIONS: Primary | ICD-10-CM

## 2023-12-14 DIAGNOSIS — R55 SYNCOPE AND COLLAPSE: ICD-10-CM

## 2023-12-14 NOTE — PROGRESS NOTES
[facility]  Consultation H&P  Salud Bocanegra  1995  [address]  [place] [address]     VISIT DATE:  12/14/23    PCP: Ramona Mcnally DO  [address]  [facility] [id number]    IDENTIFICATION: A 28 y.o. female [employer] employee  Previously followed per Dr. Perera    PROBLEM LIST:  Palpitations    OSH Holter, echocardiogram, carotid duplex-dd  Failure to thrive  G3P#  Nicotine addiction-smoking cessation 2017 vaping since      Sxhx SAMSON, nasal X3, Rotator cuff    CC:  Chief Complaint   Patient presents with    Rapid Heart Rate    Palpitations    Dizziness       Allergies  No Known Allergies    Current Medications    Current Outpatient Medications:     metoprolol succinate XL (TOPROL-XL) 25 MG 24 hr tablet, Take 1 tablet by mouth Daily., Disp: 90 tablet, Rfl: 0     History of Present Illness   HPI  Salud Bocanegra is a 28 y.o. year old female with the above mentioned PMH who presents for consult from Ramona Mcnally DO for evaluation of palpitations and failure to thrive.  Patient states that she is largely becoming disabled from work due to general feeling of malaise and heart rate being very labile at work.  She had had outside cardiology evaluation that was benign according to her report.  She does wear a smart watch and states that even changing positions that she will have heart rate to 150.  She had virtually eliminated caffeine but was previously drinking energy drinks.  She continues nicotine in the form of vape.  States that she did have influenza and was bedridden for a few days.  She cannot state that this correlates to any change of her symptomatology.  She has had no significant weight gain or weight loss recently.  Does state that on occasion she will have syncope with these symptoms and palpitation    Pt denies any chest pain, dyspnea at rest, dyspnea on exertion, orthopnea, PND,  lower extremity edema, or claudication. Pt denies history of  "CHF, DVT, PE, MI, CVA, TIA, or rheumatic fever.       ROS  Review of Systems   Cardiovascular:  Positive for near-syncope, palpitations and syncope.       SOCIAL HX  Social History     Socioeconomic History    Marital status:    Tobacco Use    Smoking status: Former     Packs/day: 0.50     Years: 13.00     Additional pack years: 0.00     Total pack years: 6.50     Types: Cigarettes     Quit date:      Years since quittin.9     Passive exposure: Past    Smokeless tobacco: Never    Tobacco comments:     Vapes    Vaping Use    Vaping Use: Every day   Substance and Sexual Activity    Alcohol use: Never    Drug use: Never    Sexual activity: Defer       FAMILY HX  Family History   Problem Relation Age of Onset    Depression Mother     Heart disease Father     Kidney disease Father     Heart failure Father        Vitals:    23 1107   BP: 106/66   BP Location: Right arm   Patient Position: Sitting   Pulse: 72   SpO2: 98%   Weight: 50.3 kg (111 lb)   Height: 165.1 cm (65\")     Body mass index is 18.47 kg/m².     PHYSICAL EXAMINATION:  Constitutional:       Appearance: Healthy appearance. Not in distress.   Neck:      Vascular: No JVR. JVD normal.   Pulmonary:      Effort: Pulmonary effort is normal.      Breath sounds: Normal breath sounds. No wheezing. No rhonchi. No rales.   Chest:      Chest wall: Not tender to palpatation.   Cardiovascular:      PMI at left midclavicular line. Normal rate. Regular rhythm. Normal S1. Normal S2.       Murmurs: There is no murmur.      No gallop.  No click. No rub.   Pulses:     Intact distal pulses.   Edema:     Peripheral edema absent.   Abdominal:      General: Bowel sounds are normal.      Palpations: Abdomen is soft.      Tenderness: There is no abdominal tenderness.   Musculoskeletal: Normal range of motion.         General: No tenderness. Skin:     General: Skin is warm and dry.   Neurological:      General: No focal deficit present.      Mental Status: Alert " "and oriented to person, place and time.         Diagnostic Data:    ECG 12 Lead    Date/Time: 12/14/2023 11:46 AM  Performed by: Raphael Whelan MD    Authorized by: Raphael Whelan MD  Comparison: compared with previous ECG from 10/25/2023  Similar to previous ECG  Rhythm: sinus rhythm  BPM: 72    Clinical impression: normal ECG          No results found for: \"CHLPL\", \"TRIG\", \"HDL\", \"LDLDIRECT\"  Lab Results   Component Value Date    GLUCOSE 88 09/27/2023    BUN 10 09/27/2023    CREATININE 0.70 09/27/2023     09/27/2023    K 4.1 09/27/2023     09/27/2023    CO2 24 09/27/2023     No results found for: \"HGBA1C\"  Lab Results   Component Value Date    WBC 6.2 09/27/2023    HGB 13.6 09/27/2023    HCT 41.8 09/27/2023     09/27/2023         Advance Care Planning   ACP discussion was declined by the patient. Patient does not have an advance directive, declines further assistance.         ASSESSMENT:   Diagnosis Plan   1. Palpitations        2. Syncope and collapse            PLAN:  Palpitations we will obtain her previous evaluation regarding echocardiogram and Holter monitor.  He is on cardioselective beta-blockade and may need to be altered to alternative agent.  It is not convincing that her symptoms are entirely related to cardiac issues.  Nicotine cessation could certainly decrease her inappropriate sinus tachycardia.    Syncope and collapse again will obtain most recent outside hospital cardiac evaluation.  She certainly could have element of vasopressor syncope.  I do not feel that tilt table test has any value        Ramona Mcnally DO, thank you for referring Ms. Bocanegra for evaluation.  I have forwarded my electronically generated recommendations to you for review.  Please do not hesitate to call with any questions.      Raphael Whelan MD, FACC      "

## 2023-12-30 DIAGNOSIS — R00.2 HEART PALPITATIONS: ICD-10-CM

## 2024-01-02 RX ORDER — METOPROLOL SUCCINATE 25 MG/1
25 TABLET, EXTENDED RELEASE ORAL DAILY
Qty: 90 TABLET | Refills: 0 | Status: SHIPPED | OUTPATIENT
Start: 2024-01-02

## 2024-01-31 ENCOUNTER — HOSPITAL ENCOUNTER (EMERGENCY)
Age: 29
Discharge: HOME | End: 2024-01-31
Payer: COMMERCIAL

## 2024-01-31 VITALS
RESPIRATION RATE: 18 BRPM | OXYGEN SATURATION: 98 % | TEMPERATURE: 99.9 F | HEART RATE: 112 BPM | DIASTOLIC BLOOD PRESSURE: 68 MMHG | SYSTOLIC BLOOD PRESSURE: 127 MMHG

## 2024-01-31 VITALS — BODY MASS INDEX: 19.5 KG/M2

## 2024-01-31 VITALS
OXYGEN SATURATION: 98 % | TEMPERATURE: 99.9 F | DIASTOLIC BLOOD PRESSURE: 68 MMHG | RESPIRATION RATE: 18 BRPM | HEART RATE: 112 BPM | SYSTOLIC BLOOD PRESSURE: 127 MMHG

## 2024-01-31 DIAGNOSIS — J10.1: Primary | ICD-10-CM

## 2024-01-31 DIAGNOSIS — R11.0: ICD-10-CM

## 2024-01-31 DIAGNOSIS — M79.18: ICD-10-CM

## 2024-01-31 DIAGNOSIS — R19.7: ICD-10-CM

## 2024-01-31 DIAGNOSIS — R53.81: ICD-10-CM

## 2024-01-31 DIAGNOSIS — R50.9: ICD-10-CM

## 2024-01-31 DIAGNOSIS — R05.9: ICD-10-CM

## 2024-01-31 PROCEDURE — 87804 INFLUENZA ASSAY W/OPTIC: CPT

## 2024-01-31 PROCEDURE — 99204 OFFICE O/P NEW MOD 45 MIN: CPT

## 2024-01-31 PROCEDURE — 99212 OFFICE O/P EST SF 10 MIN: CPT

## 2024-01-31 PROCEDURE — G0463 HOSPITAL OUTPT CLINIC VISIT: HCPCS

## 2024-01-31 NOTE — ED_ITS
Discharge Plan    
Disposition    
Patient Disposition: Home, Self-Care    
    
Condition: Good    
    
Prescriptions    
Prescriptions:    
New    
  oseltamivir [Tamiflu] 75 mg capsule     
   75 mg PO BID Qty: 10 0RF    
  ondansetron 4 mg Tablet,Disintegrating     
   4 mg PO Q8H PRN (Reason: Nausea) Qty: 12 0RF    
  benzonatate [benzonatate] 100 mg capsule     
   100 mg PO TIDP PRN (Reason: Cough) Qty: 30 0RF    
    
No Action    
  metoprolol succinate 25 mg tablet extended release 24 hr     
   25 mg PO DAILY     
   Patient Comments:    
   TAKE 1 TABLET BY MOUTH ONCE DAILY    
    
Referrals    
Follow up/Referrals:    
Radha Beth MD [Primary Care Provider] - See instructions    
    
Activity Restrictions/Add. Instructions    
Additional Instructions/Restrictions:    
Drink plenty of fluids.    
    
Take tylenol or ibuprofen for pain or fever.    
    
Take the medications as directed.    
    
Follow up with your regular doctor.    
    
***GO TO THE ER FOR ANY WORSENING SYMPTOMS***    
    
    
Clinical Impressions    
Clinical Impression:    
 Influenza B    
    
    
Stand Alone Forms    
Stand Alone Forms:  Work/School Release    
    
Instructions    
Patient Instructions:  DI for Influenza -- Adult, Ondansetron, Oseltamivir    
    
Discharge    
ED Provider: Andrew Guerrero    
    
    
[facility]    
General    
Stated complaint: cough, fever, nausea, diarrea, body aches, yael    
Time Seen by Provider: 01/31/24 12:07    
History of Present Illness    
Provider Complaint: She states that for the past 2 days she has had fever,   
chills, body aches, malaise, sore throat, nausea and a dry cough. Her    
tested positive for influenza b about 5 days ago.    
Related Data    
                                Home Medications    
    
    
    
 Medication  Instructions  Recorded  Confirmed    
     
metoprolol succinate 25 mg 25 mg PO DAILY 01/31/24 01/31/24    
    
tablet,extended release 24 hr       
    
    
                                  Previous Rx's    
    
    
    
 Medication  Instructions  Recorded    
     
benzonatate 100 mg capsule 100 mg PO TIDP PRN Cough #30 caps 01/31/24    
     
ondansetron 4 mg disintegrating 4 mg PO Q8H PRN Nausea #12 tabs 01/31/24    
    
tablet      
     
oseltamivir 75 mg capsule (Tamiflu) 75 mg PO BID #10 caps 01/31/24    
    
    
    
                                    Allergies    
    
    
    
Allergy/AdvReac Type Severity Reaction Status Date / Time    
     
No Known Allergies Allergy   Verified 01/31/24 12:19    
    
    
    
    
[facility]    
Disclaimer:     
The information contained in this section may have been updated after the   
patient was seen, as this information can be updated by other users.    
    
Social History (Reviewed 01/31/24 @ 12:19 by Katharine Rodriguez RN)    
Smoking Status:  Unknown if ever smoked     
alcohol intake:  never     
current occupational status:  employed     
Travel in the last 8 weeks:  None     
    
    
    
ROS Obtained: Yes All systems reviewed & no additional complaints except as   
documented    
Constitutional    
Constitutional: Reports chills and Reports fever(s)    
Eyes    
Eyes: Denies eye discharge    
ENT    
Ears, Nose, Mouth, and Throat: Reports as per HPI    
Cardiovascular    
Cardiovascular: Denies chest pain    
Respiratory    
Respiratory: Denies chest congestion and Reports cough    
Gastrointestinal    
Gastrointestingal: Reports nausea; Denies abdominal pain, constipation,   
cramping, diarrhea or vomiting    
Musculoskeletal    
Musculoskeletal: Denies arthralgias    
Integumentary/Breasts    
Skin/Breast: Denies rash    
Neurologic    
Neurologic: Denies paresthesias    
    
Physical Exam    
General    
General appearance: alert and in no apparent distress    
Head    
Head exam: atraumatic, normocephalic and normal inspection    
Eye    
Eye exam: Present normal appearance, PERRL and EOMI    
ENT    
ENT exam: Present normal exam, normal oropharynx, mucous membranes moist, TM's   
normal bilaterally and normal external ear exam    
Neck    
Neck exam: Present normal inspection, full ROM and trachea midline; Absent   
meningismus or lymphadenopathy    
Chest    
Chest inspection: Present normal inspection and symmetric chest wall rise;   
Absent tenderness    
Respiratory    
Respiratory exam: Present normal lung sounds bilaterally; Absent respiratory   
distress    
Cardiovascular    
Cardiovascular exam: Present regular rate and normal rhythm; Absent JVD    
Abdominal Exam    
Abdominal exam: Present soft and normal bowel sounds; Absent distention,   
tenderness or guarding    
Extremities Exam    
Extremities exam: Present normal inspection, full ROM and normal capillary   
refill; Absent calf tenderness    
Back Exam    
Back exam: Present normal inspection; Absent tenderness    
Neurological Exam    
Neurological exam: Present alert and oriented X3    
Psychiatric    
Psychiatric exam: Present normal affect and normal mood    
Skin    
Skin exam: Present warm, dry, intact and normal color    
Lymphatic    
Lymphatic Findings: no adenopathy    
    
Medical Decision Making    
Medical Records    
Medical records reviewed: No I reviewed the patient's medical records.    
Ward Inquiry    
Pt receiving controlled substance: No    
Lab Data    
Lab results reviewed: Yes I reviewed the patient's lab results.

## 2024-01-31 NOTE — EXP.UTC
Discharge Plan
Disposition
Patient Disposition: Home, Self-Care

Condition: Good

Prescriptions
Prescriptions:
New
  oseltamivir [Tamiflu] 75 mg capsule 
   75 mg PO BID Qty: 10 0RF
  ondansetron 4 mg Tablet,Disintegrating 
   4 mg PO Q8H PRN (Reason: Nausea) Qty: 12 0RF
  benzonatate [benzonatate] 100 mg capsule 
   100 mg PO TIDP PRN (Reason: Cough) Qty: 30 0RF

No Action
  metoprolol succinate 25 mg tablet extended release 24 hr 
   25 mg PO DAILY 
   Patient Comments:
   TAKE 1 TABLET BY MOUTH ONCE DAILY

Referrals
Follow up/Referrals:
Radha Beth MD [Primary Care Provider] - See instructions

Activity Restrictions/Add. Instructions
Additional Instructions/Restrictions:
Drink plenty of fluids.

Take tylenol or ibuprofen for pain or fever.

Take the medications as directed.

Follow up with your regular doctor.

***GO TO THE ER FOR ANY WORSENING SYMPTOMS***


Clinical Impressions
Clinical Impression:
 Influenza B


Stand Alone Forms
Stand Alone Forms:  Work/School Release

Instructions
Patient Instructions:  DI for Influenza -- Adult, Ondansetron, Oseltamivir

Discharge
ED Provider: Andrew Guerrero


[facility]
General
Stated complaint: cough, fever, nausea, diarrea, body aches, yael
Time Seen by Provider: 01/31/24 12:07
History of Present Illness
Provider Complaint: She states that for the past 2 days she has had fever, chills, body aches, malaise, sore throat, nausea and a dry cough. Her  tested positive for influenza b about 5 days ago.
Related Data
Home Medications

 Medication  Instructions  Recorded  Confirmed
metoprolol succinate 25 mg 25 mg PO DAILY 01/31/24 01/31/24
tablet,extended release 24 hr   

Previous Rx's

 Medication  Instructions  Recorded
benzonatate 100 mg capsule 100 mg PO TIDP PRN Cough #30 caps 01/31/24
ondansetron 4 mg disintegrating 4 mg PO Q8H PRN Nausea #12 tabs 01/31/24
tablet  
oseltamivir 75 mg capsule (Tamiflu) 75 mg PO BID #10 caps 01/31/24


Allergies

Allergy/AdvReac Type Severity Reaction Status Date / Time
No Known Allergies Allergy   Verified 01/31/24 12:19



[facility]
Disclaimer: 
The information contained in this section may have been updated after the patient was seen, as this information can be updated by other users.

Social History (Reviewed 01/31/24 @ 12:19 by Katharine Rodriguez RN)
Smoking Status:  Unknown if ever smoked 
alcohol intake:  never 
current occupational status:  employed 
Travel in the last 8 weeks:  None 



ROS Obtained: Yes All systems reviewed & no additional complaints except as documented
Constitutional
Constitutional: Reports chills and Reports fever(s)
Eyes
Eyes: Denies eye discharge
ENT
Ears, Nose, Mouth, and Throat: Reports as per HPI
Cardiovascular
Cardiovascular: Denies chest pain
Respiratory
Respiratory: Denies chest congestion and Reports cough
Gastrointestinal
Gastrointestingal: Reports nausea; Denies abdominal pain, constipation, cramping, diarrhea or vomiting
Musculoskeletal
Musculoskeletal: Denies arthralgias
Integumentary/Breasts
Skin/Breast: Denies rash
Neurologic
Neurologic: Denies paresthesias

Physical Exam
General
General appearance: alert and in no apparent distress
Head
Head exam: atraumatic, normocephalic and normal inspection
Eye
Eye exam: Present normal appearance, PERRL and EOMI
ENT
ENT exam: Present normal exam, normal oropharynx, mucous membranes moist, TM's normal bilaterally and normal external ear exam
Neck
Neck exam: Present normal inspection, full ROM and trachea midline; Absent meningismus or lymphadenopathy
Chest
Chest inspection: Present normal inspection and symmetric chest wall rise; Absent tenderness
Respiratory
Respiratory exam: Present normal lung sounds bilaterally; Absent respiratory distress
Cardiovascular
Cardiovascular exam: Present regular rate and normal rhythm; Absent JVD
Abdominal Exam
Abdominal exam: Present soft and normal bowel sounds; Absent distention, tenderness or guarding
Extremities Exam
Extremities exam: Present normal inspection, full ROM and normal capillary refill; Absent calf tenderness
Back Exam
Back exam: Present normal inspection; Absent tenderness
Neurological Exam
Neurological exam: Present alert and oriented X3
Psychiatric
Psychiatric exam: Present normal affect and normal mood
Skin
Skin exam: Present warm, dry, intact and normal color
Lymphatic
Lymphatic Findings: no adenopathy

Medical Decision Making
Medical Records
Medical records reviewed: No I reviewed the patient's medical records.
Ward Inquiry
Pt receiving controlled substance: No
Lab Data
Lab results reviewed: Yes I reviewed the patient's lab results.

## 2024-02-05 ENCOUNTER — OFFICE VISIT (OUTPATIENT)
Dept: FAMILY MEDICINE CLINIC | Facility: CLINIC | Age: 29
End: 2024-02-05
Payer: COMMERCIAL

## 2024-02-05 ENCOUNTER — TELEPHONE (OUTPATIENT)
Dept: FAMILY MEDICINE CLINIC | Facility: CLINIC | Age: 29
End: 2024-02-05
Payer: COMMERCIAL

## 2024-02-05 VITALS
SYSTOLIC BLOOD PRESSURE: 98 MMHG | TEMPERATURE: 98.9 F | WEIGHT: 108.5 LBS | OXYGEN SATURATION: 100 % | RESPIRATION RATE: 14 BRPM | HEART RATE: 99 BPM | HEIGHT: 65 IN | BODY MASS INDEX: 18.08 KG/M2 | DIASTOLIC BLOOD PRESSURE: 56 MMHG

## 2024-02-05 DIAGNOSIS — J10.1 INFLUENZA B: Primary | ICD-10-CM

## 2024-02-05 DIAGNOSIS — B34.9 VIRAL ILLNESS: ICD-10-CM

## 2024-02-05 PROCEDURE — 99213 OFFICE O/P EST LOW 20 MIN: CPT | Performed by: NURSE PRACTITIONER

## 2024-02-05 RX ORDER — CHLORCYCLIZINE HYDROCHLORIDE AND PSEUDOEPHEDRINE HYDROCHLORIDE 25; 60 MG/1; MG/1
1 TABLET ORAL 3 TIMES DAILY
Qty: 21 TABLET | Refills: 0 | Status: SHIPPED | OUTPATIENT
Start: 2024-02-05

## 2024-02-05 NOTE — TELEPHONE ENCOUNTER
Caller: Salud Bocanegra    Relationship: Self    Best call back number:  [phone]    What form or medical record are you requesting: WORK NOTE    Who is requesting this form or medical record from you: WORK PLACE    How would you like to receive the form or medical records (pick-up, mail, fax): [facility]    Timeframe paperwork needed: ASAP    Additional notes: PATIENT WAS DIAGNOSED LAST WED BY URGENT CARE FOR FLU TYPE B AND IS NEEDING AN EXTENSION ON HER WORKNOTE    PLEASE CALL TO ADVISE

## 2024-02-05 NOTE — TELEPHONE ENCOUNTER
Pt informed and understood we did not see her for the flu, for an extension for a work note, at her request she needs to schedule appointment to see a provider  due to complications  after having the flu.

## 2024-02-05 NOTE — PROGRESS NOTES
Date: 2024   Patient Name: Salud Bocanegra  : 1995   MRN: [id number]     Chief Complaint:    Chief Complaint   Patient presents with    Follow-up     Follow up from Urgent Care visit on  for flu. Can't get over illness and needs work note.       History of Present Illness: Salud Bocanegra is a 28 y.o. female who is here today for Positive for the flu on 24 at [facility]   Weak, fatigue and cough still present; no other associated symptoms.   Taking mucinex and tylenol and ibuprofen.             Review of Systems:   Review of Systems   Constitutional:  Positive for fatigue. Negative for chills and fever.   HENT:  Positive for congestion. Negative for ear pain, postnasal drip, rhinorrhea, sinus pressure and sore throat.    Respiratory:  Positive for cough. Negative for shortness of breath and wheezing.    Cardiovascular:  Negative for chest pain.   Gastrointestinal:  Negative for abdominal pain, diarrhea and nausea.   Musculoskeletal:  Negative for myalgias.   Neurological:  Negative for headache.       Past Medical History:   Past Medical History:   Diagnosis Date    Abnormal electrocardiogram     Anxiety     Depression     Deviated septum     Dizziness and giddiness     Interstitial cystitis     Nicotine dependence, unspecified, uncomplicated     Precordial pain        Past Surgical History:   Past Surgical History:   Procedure Laterality Date    BREAST AUGMENTATION  2020    BREAST SURGERY      HYSTERECTOMY  2010    NASAL SEPTUM SURGERY      x3    NOSE SURGERY      Broken nose     TUBAL ABDOMINAL LIGATION         Family History:   Family History   Problem Relation Age of Onset    Depression Mother     Heart disease Father     Kidney disease Father     Heart failure Father        Social History:   Social History     Socioeconomic History    Marital status:    Tobacco Use    Smoking status: Former     Packs/day: 0.50     Years: 13.00     Additional pack  "years: 0.00     Total pack years: 6.50     Types: Cigarettes     Quit date:      Years since quittin.0     Passive exposure: Past    Smokeless tobacco: Never    Tobacco comments:     Vapes    Vaping Use    Vaping Use: Every day   Substance and Sexual Activity    Alcohol use: Never    Drug use: Never    Sexual activity: Defer       Medications:     Current Outpatient Medications:     metoprolol succinate XL (TOPROL-XL) 25 MG 24 hr tablet, Take 1 tablet by mouth Daily., Disp: 90 tablet, Rfl: 0    Chlorcyclizine-Pseudoephed (Stahist AD) 25-60 MG tablet, Take 1 tablet by mouth 3 times a day., Disp: 21 tablet, Rfl: 0    Allergies:   No Known Allergies      Physical Exam:  Vital Signs:   Vitals:    24 1511   BP: 98/56   Pulse: 99   Resp: 14   Temp: 98.9 °F (37.2 °C)   SpO2: 100%   Weight: 49.2 kg (108 lb 8 oz)   Height: 165.1 cm (65\")     Body mass index is 18.06 kg/m².     Physical Exam  Vitals and nursing note reviewed.   Constitutional:       Appearance: She is not ill-appearing.   HENT:      Head: Normocephalic and atraumatic.      Right Ear: External ear normal. No middle ear effusion. Tympanic membrane is not erythematous or bulging.      Left Ear: External ear normal.  No middle ear effusion. Tympanic membrane is not erythematous or bulging.      Nose: Nose normal. No nasal tenderness or congestion.      Right Turbinates: Not enlarged or swollen.      Left Turbinates: Not enlarged or swollen.      Right Sinus: No maxillary sinus tenderness.      Left Sinus: No maxillary sinus tenderness.      Mouth/Throat:      Mouth: Mucous membranes are moist.      Pharynx: No pharyngeal swelling or posterior oropharyngeal erythema.      Tonsils: No tonsillar exudate.   Eyes:      Pupils: Pupils are equal, round, and reactive to light.   Cardiovascular:      Rate and Rhythm: Normal rate and regular rhythm.   Pulmonary:      Effort: Pulmonary effort is normal.      Breath sounds: Normal breath sounds.   Abdominal:    "   General: Bowel sounds are normal.   Musculoskeletal:      Cervical back: Normal range of motion and neck supple.   Skin:     General: Skin is warm.   Neurological:      General: No focal deficit present.      Mental Status: She is alert and oriented to person, place, and time.   Psychiatric:         Mood and Affect: Mood normal.           Assessment/Plan:   Diagnoses and all orders for this visit:    1. Influenza B (Primary)  -     Chlorcyclizine-Pseudoephed (Stahist AD) 25-60 MG tablet; Take 1 tablet by mouth 3 times a day.  Dispense: 21 tablet; Refill: 0    2. Viral illness  -     Chlorcyclizine-Pseudoephed (Stahist AD) 25-60 MG tablet; Take 1 tablet by mouth 3 times a day.  Dispense: 21 tablet; Refill: 0       May return to work tomorrow, 2/6/24  Increase fluid intake  Monitor for worsening symptoms    Follow Up:   Return if symptoms worsen or fail to improve.    Taylor Hanna. HIRAL   [facility]

## 2024-02-08 ENCOUNTER — HOSPITAL ENCOUNTER (EMERGENCY)
Age: 29
Discharge: HOME | End: 2024-02-08
Payer: COMMERCIAL

## 2024-02-08 VITALS — BODY MASS INDEX: 17.7 KG/M2

## 2024-02-08 VITALS
DIASTOLIC BLOOD PRESSURE: 76 MMHG | OXYGEN SATURATION: 99 % | HEART RATE: 94 BPM | SYSTOLIC BLOOD PRESSURE: 117 MMHG | RESPIRATION RATE: 18 BRPM | TEMPERATURE: 98.3 F

## 2024-02-08 VITALS
TEMPERATURE: 98.24 F | HEART RATE: 94 BPM | OXYGEN SATURATION: 99 % | SYSTOLIC BLOOD PRESSURE: 117 MMHG | DIASTOLIC BLOOD PRESSURE: 76 MMHG | RESPIRATION RATE: 18 BRPM

## 2024-02-08 DIAGNOSIS — R07.0: ICD-10-CM

## 2024-02-08 DIAGNOSIS — R05.9: ICD-10-CM

## 2024-02-08 DIAGNOSIS — J01.90: Primary | ICD-10-CM

## 2024-02-08 DIAGNOSIS — R51.9: ICD-10-CM

## 2024-02-08 DIAGNOSIS — R09.81: ICD-10-CM

## 2024-02-08 PROCEDURE — 87880 STREP A ASSAY W/OPTIC: CPT

## 2024-02-08 PROCEDURE — 99212 OFFICE O/P EST SF 10 MIN: CPT

## 2024-02-08 PROCEDURE — 99214 OFFICE O/P EST MOD 30 MIN: CPT

## 2024-02-08 PROCEDURE — G0463 HOSPITAL OUTPT CLINIC VISIT: HCPCS

## 2024-02-08 NOTE — ED_ITS
Discharge Plan    
Disposition    
Patient Disposition: Home, Self-Care    
    
Condition: Good    
    
Prescriptions    
Prescriptions:    
New    
  azithromycin [Zithromax Z-Gregory] 250 mg tablet     
   See Rx Instructions  .ROUTE .COMPLEX 5 Days Qty: 6 0RF    
   Rx Instructions:    
   For 250 mg dose pack: take 500 mg today (day 1), then 250 mg for 4 days (days  
2-5)    
  guaifenesin [Mucinex] 600 mg tablet extended release 12hr     
   600 mg PO BID PRN (Reason: cough) Qty: 20 0RF    
    
No Action    
  metoprolol succinate 25 mg tablet extended release 24 hr     
   25 mg PO DAILY     
   Patient Comments:    
   TAKE 1 TABLET BY MOUTH ONCE DAILY    
    
Referrals    
Follow up/Referrals:    
Provider,Referral, MD [Primary Care Provider] - See instructions    
    
Activity Restrictions/Add. Instructions    
Additional Instructions/Restrictions:    
    
*Monitor Temp, Over the counter Motrin or Tylenol as directed/as needed Tylenol   
every 4 hours and Motrin every 6 hours (as long as your family doctor has told   
you that you can take it) for fever or pain. and straight to ER if unable to   
lower temp less than 101.0 after medication given    
    
*Warm salt water gargles may help to soothe the throat    
    
*Throat Lozenges???????????????????     
    
*Warm fluids like tea with honey may help to soothe the throat??????     
    
*Sleep elevated    
    
*Humidifier/Vaporizer    
    
    
**Your throat swab was sent for culture. Those results are typically sent to   
your primary care. Be sure to follow up in 2-3 days with your family   
doctor/primary care physician if no improvement so they can review those result   
and treat if necessary. If you don?t have a primary care doctor, I recommend you  
get one but in the mean time, you will have to return to a walk in clinic    
    
***Follow up IMMEDIATELY for new or worsening symptoms or no Noticeable   
improvement over the next 48-72 hours. 911 for difficulty breathing or   
swallowing**    
    
Clinical Impressions    
Clinical Impression:    
Sinusitis    
Qualifiers:    
 Sinusitis location: unspecified location Chronicity: unspecified Qualified   
Code(s): J32.9 - Chronic sinusitis, unspecified    
    
    
Instructions    
Patient Instructions:  DI for Sinusitis, Sinusitis, Cough    
    
Discharge    
ED Provider: Miriam Boyd    
    
    
[facility]    
General    
Stated complaint: sore throat, congestion    
Time Seen by Provider: 02/08/24 16:00    
History of Present Illness    
Provider Complaint: Patient states that a week or so ago she had the flu States   
she got better but now she has been having sinus congestion and pressure, cough   
that is productive at times, sore throat and headache states that today she   
wasnt feeling any better so she came in    
Related Data    
                                Home Medications    
    
    
    
 Medication  Instructions  Recorded  Confirmed    
     
metoprolol succinate 25 mg 25 mg PO DAILY 01/31/24 02/08/24    
    
tablet,extended release 24 hr       
    
    
                                  Previous Rx's    
    
    
    
 Medication  Instructions  Recorded    
     
azithromycin 250 mg tablet See Rx Instructions PO .COMPLEX 5 02/08/24    
    
(Zithromax Z-Gregory) days #6 tabs     
     
guaifenesin 600 mg tablet, 600 mg PO BID PRN cough #20 tabs 02/08/24    
    
extended release 12 hr (Mucinex)      
    
    
    
                                    Allergies    
    
    
    
Allergy/AdvReac Type Severity Reaction Status Date / Time    
     
No Known Allergies Allergy   Verified 02/08/24 16:12    
    
    
    
    
[facility]    
Disclaimer:     
The information contained in this section may have been updated after the   
patient was seen, as this information can be updated by other users.    
    
Social History (Reviewed 02/08/24 @ 16:12 by Katharine Rodriguez RN)    
Smoking Status:  Unknown if ever smoked     
alcohol intake:  never     
current occupational status:  employed     
Travel in the last 8 weeks:  None     
    
    
    
ROS Obtained: Yes All systems reviewed & no additional complaints except as   
documented and Yes Systems reviewed as appropriate & no additional complaints   
except as documented    
Constitutional    
Constitutional: Reports system reviewed and no additional complaints, except as   
documented, Reports as per HPI and Reports headache(s)    
ENT    
Ears, Nose, Mouth, and Throat: Reports system reviewed and no additional   
complaints, except as documented, Reports headache(s), Reports sinus pain,   
Reports sinus pressure and Reports sore throat    
Cardiovascular    
Cardiovascular: Reports system reviewed and no additional complaints, except as   
documented and Reports as per HPI    
Respiratory    
Respiratory: Reports system reviewed and no additional complaints, except as   
documented, Reports as per HPI and Reports cough    
Gastrointestinal    
Gastrointestingal: Reports system reviewed and no additional complaints, except   
as documented and as per HPI    
Neurologic    
Neurologic: Reports headache(s)    
    
Physical Exam    
General    
General appearance: alert and in no apparent distress    
ENT    
ENT exam: Present mucous membranes moist    
Expanded ENT Exam    
Nose exam: Present sinus tenderness    
Throat exam: Present tonsillar erythema    
Respiratory    
Respiratory exam: Present normal lung sounds bilaterally; Absent respiratory   
distress or wheezes    
Cardiovascular    
Cardiovascular exam: Present regular rate, normal rhythm and normal heart sounds    
Abdominal Exam    
Abdominal exam: Present soft and normal bowel sounds; Absent distention or   
tenderness    
Neurological Exam    
Neurological exam: Present alert, oriented X3 and normal gait    
    
Medical Decision Making    
Ward Inquiry    
Pt receiving controlled substance: No    
Ward was queried for this patient: No    
Lab Data    
Lab results reviewed: Yes I reviewed the patient's lab results.

## 2024-02-08 NOTE — EXP.UTC
Discharge Plan
Disposition
Patient Disposition: Home, Self-Care

Condition: Good

Prescriptions
Prescriptions:
New
  azithromycin [Zithromax Z-Gregory] 250 mg tablet 
   See Rx Instructions  .ROUTE .COMPLEX 5 Days Qty: 6 0RF
   Rx Instructions:
   For 250 mg dose pack: take 500 mg today (day 1), then 250 mg for 4 days (days 2-5)
  guaifenesin [Mucinex] 600 mg tablet extended release 12hr 
   600 mg PO BID PRN (Reason: cough) Qty: 20 0RF

No Action
  metoprolol succinate 25 mg tablet extended release 24 hr 
   25 mg PO DAILY 
   Patient Comments:
   TAKE 1 TABLET BY MOUTH ONCE DAILY

Referrals
Follow up/Referrals:
Provider,Referral, MD [Primary Care Provider] - See instructions

Activity Restrictions/Add. Instructions
Additional Instructions/Restrictions:

*Monitor Temp, Over the counter Motrin or Tylenol as directed/as needed Tylenol every 4 hours and Motrin every 6 hours (as long as your family doctor has told you that you can take it) for fever or pain. and straight to ER if unable to lower temp 
less than 101.0 after medication given

*Warm salt water gargles may help to soothe the throat

*Throat Lozenges??????????????????? 

*Warm fluids like tea with honey may help to soothe the throat?????? 

*Sleep elevated

*Humidifier/Vaporizer


**Your throat swab was sent for culture. Those results are typically sent to your primary care. Be sure to follow up in 2-3 days with your family doctor/primary care physician if no improvement so they can review those result and treat if necessary. 
If you don?t have a primary care doctor, I recommend you get one but in the mean time, you will have to return to a walk in clinic

***Follow up IMMEDIATELY for new or worsening symptoms or no Noticeable improvement over the next 48-72 hours. 911 for difficulty breathing or swallowing**

Clinical Impressions
Clinical Impression:
Sinusitis
Qualifiers:
 Sinusitis location: unspecified location Chronicity: unspecified Qualified Code(s): J32.9 - Chronic sinusitis, unspecified


Instructions
Patient Instructions:  DI for Sinusitis, Sinusitis, Cough

Discharge
ED Provider: Miriam Boyd


[facility]
General
Stated complaint: sore throat, congestion
Time Seen by Provider: 02/08/24 16:00
History of Present Illness
Provider Complaint: Patient states that a week or so ago she had the flu States she got better but now she has been having sinus congestion and pressure, cough that is productive at times, sore throat and headache states that today she wasnt feeling 
any better so she came in
Related Data
Home Medications

 Medication  Instructions  Recorded  Confirmed
metoprolol succinate 25 mg 25 mg PO DAILY 01/31/24 02/08/24
tablet,extended release 24 hr   

Previous Rx's

 Medication  Instructions  Recorded
azithromycin 250 mg tablet See Rx Instructions PO .COMPLEX 5 02/08/24
(Zithromax Z-Gregory) days #6 tabs 
guaifenesin 600 mg tablet, 600 mg PO BID PRN cough #20 tabs 02/08/24
extended release 12 hr (Mucinex)  


Allergies

Allergy/AdvReac Type Severity Reaction Status Date / Time
No Known Allergies Allergy   Verified 02/08/24 16:12



[facility]
Disclaimer: 
The information contained in this section may have been updated after the patient was seen, as this information can be updated by other users.

Social History (Reviewed 02/08/24 @ 16:12 by Katharine Rodriguez RN)
Smoking Status:  Unknown if ever smoked 
alcohol intake:  never 
current occupational status:  employed 
Travel in the last 8 weeks:  None 



ROS Obtained: Yes All systems reviewed & no additional complaints except as documented and Yes Systems reviewed as appropriate & no additional complaints except as documented
Constitutional
Constitutional: Reports system reviewed and no additional complaints, except as documented, Reports as per HPI and Reports headache(s)
ENT
Ears, Nose, Mouth, and Throat: Reports system reviewed and no additional complaints, except as documented, Reports headache(s), Reports sinus pain, Reports sinus pressure and Reports sore throat
Cardiovascular
Cardiovascular: Reports system reviewed and no additional complaints, except as documented and Reports as per HPI
Respiratory
Respiratory: Reports system reviewed and no additional complaints, except as documented, Reports as per HPI and Reports cough
Gastrointestinal
Gastrointestingal: Reports system reviewed and no additional complaints, except as documented and as per HPI
Neurologic
Neurologic: Reports headache(s)

Physical Exam
General
General appearance: alert and in no apparent distress
ENT
ENT exam: Present mucous membranes moist
Expanded ENT Exam
Nose exam: Present sinus tenderness
Throat exam: Present tonsillar erythema
Respiratory
Respiratory exam: Present normal lung sounds bilaterally; Absent respiratory distress or wheezes
Cardiovascular
Cardiovascular exam: Present regular rate, normal rhythm and normal heart sounds
Abdominal Exam
Abdominal exam: Present soft and normal bowel sounds; Absent distention or tenderness
Neurological Exam
Neurological exam: Present alert, oriented X3 and normal gait

Medical Decision Making
Ward Inquiry
Pt receiving controlled substance: No
Ward was queried for this patient: No
Lab Data
Lab results reviewed: Yes I reviewed the patient's lab results.

## 2024-03-28 ENCOUNTER — OFFICE VISIT (OUTPATIENT)
Dept: CARDIOLOGY | Facility: CLINIC | Age: 29
End: 2024-03-28
Payer: COMMERCIAL

## 2024-03-28 VITALS
DIASTOLIC BLOOD PRESSURE: 58 MMHG | OXYGEN SATURATION: 95 % | HEART RATE: 77 BPM | WEIGHT: 110 LBS | SYSTOLIC BLOOD PRESSURE: 96 MMHG | HEIGHT: 65 IN | BODY MASS INDEX: 18.33 KG/M2

## 2024-03-28 DIAGNOSIS — R55 SYNCOPE AND COLLAPSE: Primary | ICD-10-CM

## 2024-03-28 DIAGNOSIS — R00.2 PALPITATIONS: ICD-10-CM

## 2024-03-28 PROCEDURE — 99213 OFFICE O/P EST LOW 20 MIN: CPT | Performed by: INTERNAL MEDICINE

## 2024-03-28 RX ORDER — MIDODRINE HYDROCHLORIDE 5 MG/1
2.5 TABLET ORAL 2 TIMES DAILY
Qty: 180 TABLET | Refills: 3 | Status: SHIPPED | OUTPATIENT
Start: 2024-03-28

## 2024-03-28 NOTE — PROGRESS NOTES
"[facility] Cardiology  Consultation H&P  Salud Bocanegra  1995  [address]  [place] [address]     VISIT DATE:  24    PCP: Ramona Mcnally,   [address]  [place] [address]    IDENTIFICATION: A 29 y.o. female [employer] employee  Previously followed per Dr. Perera    PROBLEM LIST:  JASON     OSH CTA chest no PE     sleep eval AHI 1.9  Palpitations/syncope    OSH ([facility]) Holter, echocardiogram      CUS wnl  Failure to thrive    Nicotine addiction-smoking cessation 2017 vaping since      Sxhx SAMSON, nasal X3, Rotator cuff    CC:  Chief Complaint   Patient presents with    Palpitations       Allergies  No Known Allergies    Current Medications    Current Outpatient Medications:     metoprolol succinate XL (TOPROL-XL) 25 MG 24 hr tablet, Take 1 tablet by mouth Daily., Disp: 90 tablet, Rfl: 0     History of Present Illness   HPI  Salud Bocanegra is a 29 y.o. year old female with the above mentioned PMH who presents for fu.   Continues with low blood pressures and presyncope and syncopal episodes.  She cannot tolerate any exposure to heat specifically the shower she states when she tries to shampoo her hair she will get presyncopal  Vitals:    24 1521   BP: 96/58   BP Location: Right arm   Patient Position: Sitting   Cuff Size: Adult   Pulse: 77   SpO2: 95%   Weight: 49.9 kg (110 lb)   Height: 165.1 cm (65\")       Body mass index is 18.3 kg/m².     PHYSICAL EXAMINATION:  Constitutional:       Appearance: Healthy appearance. Not in distress.   Neck:      Vascular: No JVR. JVD normal.   Pulmonary:      Effort: Pulmonary effort is normal.      Breath sounds: Normal breath sounds. No wheezing. No rhonchi. No rales.   Chest:      Chest wall: Not tender to palpatation.   Cardiovascular:      PMI at left midclavicular line. Normal rate. Regular rhythm. Normal S1. Normal S2.       Murmurs: There is no murmur.      No gallop.  No click. No rub. " "  Pulses:     Intact distal pulses.   Edema:     Peripheral edema absent.   Abdominal:      General: Bowel sounds are normal.      Palpations: Abdomen is soft.      Tenderness: There is no abdominal tenderness.   Musculoskeletal: Normal range of motion.         General: No tenderness. Skin:     General: Skin is warm and dry.   Neurological:      General: No focal deficit present.      Mental Status: Alert and oriented to person, place and time.         Diagnostic Data:  Procedures    No results found for: \"CHLPL\", \"TRIG\", \"HDL\", \"LDLDIRECT\"  Lab Results   Component Value Date    GLUCOSE 88 09/27/2023    BUN 10 09/27/2023    CREATININE 0.70 09/27/2023     09/27/2023    K 4.1 09/27/2023     09/27/2023    CO2 24 09/27/2023     No results found for: \"HGBA1C\"  Lab Results   Component Value Date    WBC 6.2 09/27/2023    HGB 13.6 09/27/2023    HCT 41.8 09/27/2023     09/27/2023         Advance Care Planning   ACP discussion was declined by the patient. Patient does not have an advance directive, declines further assistance.         ASSESSMENT:   Diagnosis Plan   1. Syncope and collapse        2. Palpitations              PLAN:  Presyncope/syncope with low normal filling pressures.  Trial of midodrine to see if we can obtain blood pressure that allows her to have normal quality of life    Palpitations and high resting heart rate recommended attempts to wean and discontinue nicotine will continue beta-blockade suppression otherwise    No ref. provider found, thank you for referring Ms. Bocanegra for evaluation.  I have forwarded my electronically generated recommendations to you for review.  Please do not hesitate to call with any questions.      Raphael Whelan MD, FACC      "

## 2024-04-09 DIAGNOSIS — R00.2 HEART PALPITATIONS: ICD-10-CM

## 2024-04-09 RX ORDER — METOPROLOL SUCCINATE 25 MG/1
25 TABLET, EXTENDED RELEASE ORAL DAILY
Qty: 90 TABLET | Refills: 0 | Status: SHIPPED | OUTPATIENT
Start: 2024-04-09

## 2024-07-01 ENCOUNTER — TELEMEDICINE (OUTPATIENT)
Dept: FAMILY MEDICINE CLINIC | Facility: TELEHEALTH | Age: 29
End: 2024-07-01
Payer: COMMERCIAL

## 2024-07-01 DIAGNOSIS — H92.03 OTALGIA OF BOTH EARS: Primary | ICD-10-CM

## 2024-07-01 DIAGNOSIS — H92.13 EAR DISCHARGE OF BOTH EARS: ICD-10-CM

## 2024-07-01 PROCEDURE — 99213 OFFICE O/P EST LOW 20 MIN: CPT | Performed by: NURSE PRACTITIONER

## 2024-07-01 RX ORDER — CIPROFLOXACIN AND DEXAMETHASONE 3; 1 MG/ML; MG/ML
4 SUSPENSION/ DROPS AURICULAR (OTIC) 2 TIMES DAILY
Qty: 7.5 ML | Refills: 0 | Status: SHIPPED | OUTPATIENT
Start: 2024-07-01 | End: 2024-07-08

## 2024-07-01 NOTE — PROGRESS NOTES
Subjective   Salud Bocanegra is a 29 y.o. female.     History of Present Illness  She presents with complaint of bilateral ear pain that has been going on for one month. She has tried leftover amoxicillin and it did not help. The pain is right on the inside of both ears. Her ears feel swollen inside, like there are blisters. She has also had crusted yellow drainage from her ears when she wakes up in the morning. Denies recently swimming or using ear buds.         The following portions of the patient's history were reviewed and updated as appropriate: allergies, current medications, past family history, past medical history, past social history, past surgical history, and problem list.    Review of Systems   Constitutional:  Negative for fever.   HENT:  Positive for ear discharge and ear pain.    Respiratory: Negative.     Gastrointestinal: Negative.        Objective   Physical Exam  Constitutional:       General: She is not in acute distress.     Appearance: She is well-developed. She is not diaphoretic.   Pulmonary:      Effort: Pulmonary effort is normal.   Neurological:      Mental Status: She is alert and oriented to person, place, and time.   Psychiatric:         Behavior: Behavior normal.           Assessment & Plan   Diagnoses and all orders for this visit:    1. Otalgia of both ears (Primary)  -     ciprofloxacin-dexAMETHasone (Ciprodex) 0.3-0.1 % otic suspension; Administer 4 drops into both ears 2 (Two) Times a Day for 7 days.  Dispense: 7.5 mL; Refill: 0    2. Ear discharge of both ears  -     ciprofloxacin-dexAMETHasone (Ciprodex) 0.3-0.1 % otic suspension; Administer 4 drops into both ears 2 (Two) Times a Day for 7 days.  Dispense: 7.5 mL; Refill: 0      Will treat empirically for otitis externa, she should be seen in person if no improvement in 2-3 days or if symptoms worsen.            The use of a video visit has been reviewed with the patient and verbal informed consent has been obtained.  Myself and Salud Bocanegra participated in this visit. The patient is located in  [place], [place] . I am located in [place], [place]. "[employer] and [employer] were utilized. I spent 15 minutes in the patient's chart for this visit.

## 2024-07-01 NOTE — PATIENT INSTRUCTIONS
-To administer ear drops, lay on your side and instill drops, then insert a small piece of cotton ball and continue to lay on your side for 5-10+ minutes, then alternate to the other side.  -Avoid submerging your head in water or inserting any foreign object in your ear while your ears are healing

## 2024-11-20 ENCOUNTER — TELEPHONE (OUTPATIENT)
Dept: CARDIOLOGY | Facility: CLINIC | Age: 29
End: 2024-11-20
Payer: COMMERCIAL

## 2024-11-20 NOTE — TELEPHONE ENCOUNTER
Pt called stating her blood pressure along with pulse are very labile , therfore she has stopped taking any of her blood pressure medications. Encouraged pt to keep a blood pressure log for a week ,along with  adequate hydration, to call back with results . Verbalized understanding .

## 2025-01-02 ENCOUNTER — HOSPITAL ENCOUNTER (EMERGENCY)
Age: 30
Discharge: HOME | End: 2025-01-02
Payer: COMMERCIAL

## 2025-01-02 VITALS
HEART RATE: 81 BPM | RESPIRATION RATE: 18 BRPM | DIASTOLIC BLOOD PRESSURE: 75 MMHG | TEMPERATURE: 98.24 F | SYSTOLIC BLOOD PRESSURE: 111 MMHG | OXYGEN SATURATION: 99 %

## 2025-01-02 VITALS
SYSTOLIC BLOOD PRESSURE: 99 MMHG | HEART RATE: 82 BPM | DIASTOLIC BLOOD PRESSURE: 72 MMHG | TEMPERATURE: 98.24 F | OXYGEN SATURATION: 95 % | RESPIRATION RATE: 20 BRPM

## 2025-01-02 VITALS — BODY MASS INDEX: 20.3 KG/M2

## 2025-01-02 VITALS — OXYGEN SATURATION: 98 % | DIASTOLIC BLOOD PRESSURE: 65 MMHG | SYSTOLIC BLOOD PRESSURE: 107 MMHG | HEART RATE: 80 BPM

## 2025-01-02 DIAGNOSIS — G90.A: Primary | ICD-10-CM

## 2025-01-02 DIAGNOSIS — R07.9: ICD-10-CM

## 2025-01-02 DIAGNOSIS — R20.2: ICD-10-CM

## 2025-01-02 LAB
ALBUMIN LEVEL: 4.9 G/DL (ref 3.5–5)
ALBUMIN/GLOB SERPL: 1.9 {RATIO} (ref 1.1–1.8)
ALP ISO SERPL-ACNC: 68 U/L (ref 38–126)
ALT SERPLBLD-CCNC: 14 U/L (ref 12–78)
ANION GAP SERPL CALC-SCNC: 8.6 MEQ/L (ref 5–15)
AST SERPL QL: 28 U/L (ref 14–36)
BILIRUBIN,TOTAL: 0.7 MG/DL (ref 0.2–1.3)
BUN SERPL-MCNC: 15 MG/DL (ref 7–17)
CALCIUM SPEC-MCNC: 9.8 MG/DL (ref 8.4–10.2)
CHLORIDE SPEC-SCNC: 103 MMOL/L (ref 98–107)
CO2 SERPL-SCNC: 30 MMOL/L (ref 22–30)
CREAT BLD-SCNC: 0.6 MG/DL (ref 0.52–1.04)
CREATININE CLEARANCE ESTIMATED: 112 ML/MIN (ref 50–200)
ESTIMATED GLOMERULAR FILT RATE: 117 ML/MIN (ref 60–?)
FT4I SERPL CALC-MCNC: 2.2 UG/DL (ref 5.93–13.13)
GFR (AFRICAN AMERICAN): 141 ML/MIN (ref 60–?)
GLOBULIN SER CALC-MCNC: 2.6 G/DL (ref 1.3–3.2)
GLUCOSE: 73 MG/DL (ref 74–100)
HCO3 BLDV-SCNC: 24.9 MMOL/L (ref 23–30)
HCT VFR BLD CALC: 40.8 % (ref 37–47)
HGB BLD-MCNC: 14 G/DL (ref 12.2–16.2)
LACTATE VENOUS: 1.6 MMOL/L (ref 0.4–2)
MAGNESIUM: 1.8 MG/DL (ref 1.6–2.3)
MCHC RBC-ENTMCNC: 34.3 G/DL (ref 31.8–35.4)
MCV RBC: 89.9 FL (ref 81–99)
MEAN CORPUSCULAR HEMOGLOBIN: 30.8 PG (ref 27–31.2)
PCO2 BLDV: 51 MMOL/L (ref 35–51)
PH BLDV: 7.31 MMOL/L (ref 7.31–7.41)
PLATELET # BLD: 239 K/MM3 (ref 142–424)
PO2 BLDV: 28.9 MMOL/L (ref 28–40)
POTASSIUM: 3.6 MMOL/L (ref 3.5–5.1)
PROT SERPL-MCNC: 7.5 G/DL (ref 6.3–8.2)
RBC # BLD AUTO: 4.54 M/MM3 (ref 4.2–5.4)
SODIUM SPEC-SCNC: 138 MMOL/L (ref 136–145)
T3RU NFR SERPL: 28 % (ref 23.5–40.5)
T4 (THYROXINE): 7.9 UG/DL (ref 5.53–11)
TROPONIN I: < 0.01 NG/ML (ref 0–0.03)
TSH SERPL-ACNC: 0.66 UIU/ML (ref 0.47–4.68)
WBC # BLD AUTO: 7.6 K/MM3 (ref 4.8–10.8)

## 2025-01-02 PROCEDURE — 83735 ASSAY OF MAGNESIUM: CPT

## 2025-01-02 PROCEDURE — 86803 HEPATITIS C AB TEST: CPT

## 2025-01-02 PROCEDURE — 80053 COMPREHEN METABOLIC PANEL: CPT

## 2025-01-02 PROCEDURE — 84436 ASSAY OF TOTAL THYROXINE: CPT

## 2025-01-02 PROCEDURE — 84484 ASSAY OF TROPONIN QUANT: CPT

## 2025-01-02 PROCEDURE — 84479 ASSAY OF THYROID (T3 OR T4): CPT

## 2025-01-02 PROCEDURE — 87389 HIV-1 AG W/HIV-1&-2 AB AG IA: CPT

## 2025-01-02 PROCEDURE — 99283 EMERGENCY DEPT VISIT LOW MDM: CPT

## 2025-01-02 PROCEDURE — 84443 ASSAY THYROID STIM HORMONE: CPT

## 2025-01-02 PROCEDURE — 93005 ELECTROCARDIOGRAM TRACING: CPT

## 2025-01-02 PROCEDURE — 85025 COMPLETE CBC W/AUTO DIFF WBC: CPT

## 2025-01-02 PROCEDURE — 82803 BLOOD GASES ANY COMBINATION: CPT

## 2025-01-02 NOTE — [email or web address]
Discharge Plan
Disposition
Patient Disposition: Home, Self-Care

Condition: Good

Chief Complaint: Chest Pain

Prescriptions
Prescriptions:
No Action
  ofloxacin 0.3 % drops 
   10 drp otic (ear) Q12H 14 Days Qty: 20 0RF
   Rx Instructions:
   to left ear as directed

Referrals
Follow up/Referrals:
Radha Beth MD [Primary Care Provider] - See instructions

Activity Restrictions/Add. Instructions
Additional Instructions/Restrictions:
I would suggest taking a over-the-counter magnesium tablet once a day.  I have referred you to cardiology.  Please call and make an appointment at your convenience.  Follow-up with your PCP for no improvement or worsening signs or symptoms or return 
to the ER as needed.

Clinical Impressions
Clinical Impression:
 Postural orthostatic tachycardia syndrome [POTS]

Chest pain
Qualifiers:
 Chest pain type: unspecified Qualified Code(s): R07.9 - Chest pain, unspecified


Print Language
Print Language: English

Discharge
ED Provider: CINDY Camejo


General Adult HPI
General
Chief complaint: Chest Pain
Stated complaint: chest pain
Time Seen by Provider: 01/02/25 12:07
History of Present Illness
HPI narrative: 
Patient presents for evaluation of chest pain.  Patient has a known diagnosis of POTS syndrome and follows with Dr. Prakash at [facility].  She does have intermittent chest pain with associated tachycardia intermittently since her 
diagnosis.  However today patient's episode began while walking and was not related to a change in position.  She did note that her heart rate was very fast although she did not count it.  Patient also stated that she felt like her fingers were 
tingling or numb sometime after this started.  However it resolved by the time she came to the ER.  Patient had been on a beta-blocker until about 3 months ago and she self discontinued because she was having a heart rate down to the 40s and 
symptomatic.  She has not followed up with cardiology since.  She denies any recent illness shortness of breath fever chills hemoptysis hematochezia melena nausea vomit diarrhea.
Related Data
Previous Rx's

?Medication ?Instructions ?Recorded
ofloxacin 0.3 % ear drops 10 drp otic (ear) Q12H 14 days #20 12/05/24
 mL 


Allergies

Allergy/AdvReac Type Severity Reaction Status Date / Time
No Known Allergies Allergy   Verified 02/08/24 16:12



[facility]
Disclaimer: 
The information contained in this section may have been updated after the patient was seen, as this information can be updated by other users.

Medical History (Updated 01/02/25 @ 13:06 by VIKAS Perez)

Anemia
Depression
Anxiety


Surgical History (Updated 12/05/24 @ 11:47 by Elizabeth Boothe RN)

History of tubal ligation
History of hysterectomy


Social History (Reviewed 02/08/24 @ 16:12 by Katharine Rodriguez RN)
Smoking Status:  Never smoker 
alcohol intake:  never 
current occupational status:  employed 
Travel in the last 8 weeks:  None 
Have you lived/traveled outside [place] in past 30 days?:  No 
Contact w/someone who lives/traveled outside US past 30 days?:  No 
Exposure to someone with infectious disease in past 14 days?:  No 
Do you have a fever (greater than 100.4 F or 38 C)?:  No 
Have you tested positive for COVID-19:  No 
Exposed to someone with COVID-19 in past 14 days?:  No 
Do you have a sore throat?:  No 
Do you have a cough?:  No 
Do you have any weakness?:  No 
Do you have any diarrhea?:  No 
Are you experiencing any unusual bleeding?:  No 
Do you have any muscle aches/pain?:  No 
Do you have any abdominal pain?:  No 
Are you experiencing loss of taste or smell?:  No 



ROS Obtained: Yes Systems reviewed as appropriate & no additional complaints except as documented

Physical Exam
General
General appearance: alert and in no apparent distress
Respiratory
Respiratory exam: Present normal lung sounds bilaterally
Cardiovascular
Cardiovascular exam: Present regular rate
Neurological Exam
Neurological exam: Present alert and oriented X3

Medical Decision Making
Medical Records
Medical records reviewed: Yes I reviewed the patient's medical records.
Screening: 
Per USPSTF and CDC recommendations, given the prevalence of disease in our region, it is our hospital?s policy to screen for HIV and viral Hepatitis for all patients aged 18 and over and those with ongoing risk factors. 

Ward Inquiry
Pt receiving controlled substance: No
Vital Signs: 



 01/02/25
11:52 01/02/25
12:30
Temperature 98.3 F 
Temperature Source Oral 
Pulse Rate  80
Pulse Rate [Right] 81 
Respiratory Rate 18 
Blood Pressure  107/65 L
Blood Pressure [Right Arm] 111/75 
Blood Pressure Mean [Right Arm] 87 
02 Sat by Pulse Oximetry 99 98
Oxygen Delivery Method  Room Air



Lab Data
Lab results reviewed: Yes I reviewed the patient's lab results.

Lab Results

01/02/25 11:55: WBC 7.6, RBC 4.54, Hgb 14.0, Hct 40.8, MCV 89.9, MCH 30.8, MCHC 34.3, RDW 12.1, Plt Count 239, MPV 10.1, Neut % (Auto) 63.4, Lymph % (Auto) 23.2, Mono % (Auto) 4.7, Eos % (Auto) 7.0, Baso % (Auto) 1.2, Neut # (Auto) 4.8, Lymph # 
(Auto) 1.8, Mono # (Auto) 0.4, Eos # (Auto) 0.5 H, Baso # (Auto) 0.1, Sodium 138, Potassium 3.6, Chloride 103, Carbon Dioxide 30, Anion Gap 8.6, BUN 15, Creatinine 0.60, Estimated Creat Clear 112, Estimated , Est GFR (African Amer) 141, 
Glucose 73 L, Calcium 9.8, Magnesium 1.8, Total Bilirubin 0.7, AST 28, ALT 14, Alkaline Phosphatase 68, Troponin I < 0.01, Total Protein 7.5, Albumin 4.9, Globulin 2.6, Albumin/Globulin Ratio 1.9 H
01/02/25 12:18: VBG pH 7.31, VBG pCO2 51.0, VBG pO2 28.9, VBG HCO3 24.9, VBG Total CO2 26.4, VBG O2 Saturation 52.0, VBG Base Excess -1.5, VBG Lactic Acid 1.6




01/02/25 11:55          

01/02/25 11:55          

Orders (Tests/Meds): 

ORDERS

 Category Date Time Status
 CBC w/Auto Diff [Complete Blood Count Auto Diff] Stat Lab  01/02/25 11:55 Completed
 CMP [Comprehensive Metabolic Panel] Stat Lab  01/02/25 11:55 Completed
 HIV Combo Stat Lab  01/02/25 11:55 Received
 Hep C Ab with Reflex to RNA Stat Lab  01/02/25 12:09 Ordered
 Magnesium Stat Lab  01/02/25 11:55 Completed
 Thyroid Panel Stat Lab  01/02/25 11:55 Received
 Trop I [Troponin I] Stat Lab  01/02/25 11:55 Completed
 Troponin I Q3H Lab  01/02/25 15:30 Ordered
 Troponin I Q3H Lab  01/02/25 18:30 Ordered
 VBG [Venous Blood Gas] Stat RT  01/02/25 12:18 Completed



HEART Score
History (anamnesis): Slightly suspicious
ECG: Normal
Age: <45 years
Risk factors: 1-2 risk factors
Troponin: </= normal limit
HEART Score: 1
Medical Decision Narrative: 
In summary patient is a 29-year-old female who presents to the emergency department for evaluation of chest pain.  Patient is hemodynamically stable upon arrival, afebrile.  Physical exam is unremarkable and nonfocal including normal breath sounds 
normal heart sounds what appears to be normal sinus rhythm on the bedside monitor.  Patient has full range of motion in her upper extremities [place] Coma Score 15 patient is awake alert and oriented to person place and circumstance cranial nerves 
II through XII are intact listed exam.. Differential diagnosis includes POTS syndrome versus electrolyte abnormality versus hyperventilation etc.  Initial workup will be conducted with hematologic labs twelve-lead EKG VBG.  Initial interventions 
were considered however patient is asymptomatic currently thus deferred for now.  Initial workup reviewed by me and her hematologic labs are nonactionable including a negative troponin her magnesium however is 1.8 which is within the normal range 
but below 2.  Upon repeat evaluation patient remains asymptomatic.  Given this I had interactive discussion with the patient regarding her findings and the recommendations and via patient directed decision making and discharge patient is comfortable 
with going home after reassurance that we have ruled out any serious or life-threatening condition and would like a referral to a different cardiologist.  Thus patient is ready for discharge with referral to cardiology group.  [facility].





Critical Care
Critical Care Time
Critical Care Time: No

## 2025-01-02 NOTE — ED_ITS
<Statement entered by CINDY Camejo MD - 01/02/25 15:30>    
I was consulted by the FEDE, and we discussed the complexity of the problems   
being addressed.  I approved the treatment and management plan for this   
patient's care in the emergency department, thus performing a substantive   
portion of the medical decision making.    
    
CINDY Camejo MD, LEIGH ANN, FACEP     
    
Discharge Plan    
Disposition    
Patient Disposition: Home, Self-Care    
    
Condition: Good    
    
Chief Complaint: Chest Pain    
    
Prescriptions    
Prescriptions:    
No Action    
  ofloxacin 0.3 % drops     
   10 drp otic (ear) Q12H 14 Days Qty: 20 0RF    
   Rx Instructions:    
   to left ear as directed    
    
Referrals    
Follow up/Referrals:    
Radha Beth MD [Primary Care Provider] - See instructions    
    
Activity Restrictions/Add. Instructions    
Additional Instructions/Restrictions:    
I would suggest taking a over-the-counter magnesium tablet once a day.  I have   
referred you to cardiology.  Please call and make an appointment at your   
convenience.  Follow-up with your PCP for no improvement or worsening signs or   
symptoms or return to the ER as needed.    
    
Clinical Impressions    
Clinical Impression:    
 Postural orthostatic tachycardia syndrome [POTS]    
    
Chest pain    
Qualifiers:    
 Chest pain type: unspecified Qualified Code(s): R07.9 - Chest pain, unspecified    
    
    
Print Language    
Print Language: English    
    
Discharge    
ED Provider: CINDY Camejo    
    
    
General Adult HPI    
General    
Chief complaint: Chest Pain    
Stated complaint: chest pain    
Time Seen by Provider: 01/02/25 12:07    
History of Present Illness    
HPI narrative:     
Patient presents for evaluation of chest pain.  Patient has a known diagnosis of  
 POTS syndrome and follows with Dr. Prakash at [facility].  She   
does have intermittent chest pain with associated tachycardia intermittently   
since her diagnosis.  However today patient's episode began while walking and   
was not related to a change in position.  She did note that her heart rate was   
very fast although she did not count it.  Patient also stated that she felt like  
 her fingers were tingling or numb sometime after this started.  However it   
resolved by the time she came to the ER.  Patient had been on a beta-blocker   
until about 3 months ago and she self discontinued because she was having a   
heart rate down to the 40s and symptomatic.  She has not followed up with   
cardiology since.  She denies any recent illness shortness of breath fever   
chills hemoptysis hematochezia melena nausea vomit diarrhea.    
Related Data    
                                  Previous Rx's    
    
    
    
?Medication ?Instructions ?Recorded    
     
ofloxacin 0.3 % ear drops 10 drp otic (ear) Q12H 14 days #20 12/05/24    
    
 mL     
    
    
    
                                    Allergies    
    
    
    
Allergy/AdvReac Type Severity Reaction Status Date / Time    
     
No Known Allergies Allergy   Verified 02/08/24 16:12    
    
    
    
    
[facility]    
Disclaimer:     
The information contained in this section may have been updated after the   
patient was seen, as this information can be updated by other users.    
    
Medical History (Updated 01/02/25 @ 13:06 by VIKAS Perez)    
    
Anemia    
Depression    
Anxiety    
    
    
Surgical History (Updated 12/05/24 @ 11:47 by Elizabeth Boothe RN)    
    
History of tubal ligation    
History of hysterectomy    
    
    
Social History (Reviewed 02/08/24 @ 16:12 by Katharine Rodriguez RN)    
Smoking Status:  Never smoker     
alcohol intake:  never     
current occupational status:  employed     
Travel in the last 8 weeks:  None     
Have you lived/traveled outside US in past 30 days?:  No     
Contact w/someone who lives/traveled outside US past 30 days?:  No     
Exposure to someone with infectious disease in past 14 days?:  No     
Do you have a fever (greater than 100.4 F or 38 C)?:  No     
Have you tested positive for COVID-19:  No     
Exposed to someone with COVID-19 in past 14 days?:  No     
Do you have a sore throat?:  No     
Do you have a cough?:  No     
Do you have any weakness?:  No     
Do you have any diarrhea?:  No     
Are you experiencing any unusual bleeding?:  No     
Do you have any muscle aches/pain?:  No     
Do you have any abdominal pain?:  No     
Are you experiencing loss of taste or smell?:  No     
    
    
    
ROS Obtained: Yes Systems reviewed as appropriate & no additional complaints   
except as documented    
    
Physical Exam    
General    
General appearance: alert and in no apparent distress    
Respiratory    
Respiratory exam: Present normal lung sounds bilaterally    
Cardiovascular    
Cardiovascular exam: Present regular rate    
Neurological Exam    
Neurological exam: Present alert and oriented X3    
    
Medical Decision Making    
Medical Records    
Medical records reviewed: Yes I reviewed the patient's medical records.    
Screening:     
Per USPSTF and CDC recommendations, given the prevalence of disease in our   
region, it is our hospital?s policy to screen for HIV and viral Hepatitis for   
all patients aged 18 and over and those with ongoing risk factors.     
    
Ward Inquiry    
Pt receiving controlled substance: No    
Vital Signs:     
    
                                            
    
    
    
 01/02/25    
11:52 01/02/25    
12:30    
     
Temperature 98.3 F     
     
Temperature Source Oral     
     
Pulse Rate  80    
     
Pulse Rate [Right] 81     
     
Respiratory Rate 18     
     
Blood Pressure  107/65 L    
     
Blood Pressure [Right Arm] 111/75     
     
Blood Pressure Mean [Right Arm] 87     
     
02 Sat by Pulse Oximetry 99 98    
     
Oxygen Delivery Method  Room Air    
    
    
    
    
Lab Data    
Lab results reviewed: Yes I reviewed the patient's lab results.    
    
                                   Lab Results    
    
01/02/25 11:55: WBC 7.6, RBC 4.54, Hgb 14.0, Hct 40.8, MCV 89.9, MCH 30.8, MCHC   
34.3, RDW 12.1, Plt Count 239, MPV 10.1, Neut % (Auto) 63.4, Lymph % (Auto)   
23.2, Mono % (Auto) 4.7, Eos % (Auto) 7.0, Baso % (Auto) 1.2, Neut # (Auto) 4.8,  
Lymph # (Auto) 1.8, Mono # (Auto) 0.4, Eos # (Auto) 0.5 H, Baso # (Auto) 0.1,   
Sodium 138, Potassium 3.6, Chloride 103, Carbon Dioxide 30, Anion Gap 8.6, BUN   
15, Creatinine 0.60, Estimated Creat Clear 112, Estimated , Est GFR   
(African Amer) 141, Glucose 73 L, Calcium 9.8, Magnesium 1.8, Total Bilirubin   
0.7, AST 28, ALT 14, Alkaline Phosphatase 68, Troponin I < 0.01, Total Protein   
7.5, Albumin 4.9, Globulin 2.6, Albumin/Globulin Ratio 1.9 H    
01/02/25 12:18: VBG pH 7.31, VBG pCO2 51.0, VBG pO2 28.9, VBG HCO3 24.9, VBG   
Total CO2 26.4, VBG O2 Saturation 52.0, VBG Base Excess -1.5, VBG Lactic Acid   
1.6    
    
    
    
    
                                                        01/02/25 11:55              
    
                                                        01/02/25 11:55              
    
Orders (Tests/Meds):     
    
                                     ORDERS    
    
    
    
 Category Date Time Status    
     
 CBC w/Auto Diff [Complete Blood Count Auto Diff] Stat Lab  01/02/25 11:55   
Completed    
     
 CMP [Comprehensive Metabolic Panel] Stat Lab  01/02/25 11:55 Completed    
     
 HIV Combo Stat Lab  01/02/25 11:55 Received    
     
 Hep C Ab with Reflex to RNA Stat Lab  01/02/25 12:09 Ordered    
     
 Magnesium Stat Lab  01/02/25 11:55 Completed    
     
 Thyroid Panel Stat Lab  01/02/25 11:55 Received    
     
 Trop I [Troponin I] Stat Lab  01/02/25 11:55 Completed    
     
 Troponin I Q3H Lab  01/02/25 15:30 Ordered    
     
 Troponin I Q3H Lab  01/02/25 18:30 Ordered    
     
 VBG [Venous Blood Gas] Stat RT  01/02/25 12:18 Completed    
    
    
    
    
HEART Score    
History (anamnesis): Slightly suspicious    
ECG: Normal    
Age: <45 years    
Risk factors: 1-2 risk factors    
Troponin: </= normal limit    
HEART Score: 1    
Medical Decision Narrative:     
In summary patient is a 29-year-old female who presents to the emergency   
department for evaluation of chest pain.  Patient is hemodynamically stable upon  
arrival, afebrile.  Physical exam is unremarkable and nonfocal including normal   
breath sounds normal heart sounds what appears to be normal sinus rhythm on the   
bedside monitor.  Patient has full range of motion in her upper extremities   
[place] Coma Score 15 patient is awake alert and oriented to person place and   
circumstance cranial nerves II through XII are intact listed exam.. Differential  
diagnosis includes POTS syndrome versus electrolyte abnormality versus   
hyperventilation etc.  Initial workup will be conducted with hematologic labs   
twelve-lead EKG VBG.  Initial interventions were considered however patient is   
asymptomatic currently thus deferred for now.  Initial workup reviewed by me and  
her hematologic labs are nonactionable including a negative troponin her   
magnesium however is 1.8 which is within the normal range but below 2.  Upon   
repeat evaluation patient remains asymptomatic.  Given this I had interactive   
discussion with the patient regarding her findings and the recommendations and   
via patient directed decision making and discharge patient is comfortable with   
going home after reassurance that we have ruled out any serious or life-  
threatening condition and would like a referral to a different cardiologist.    
Thus patient is ready for discharge with referral to cardiology group.  [facility].    
    
    
    
    
    
Critical Care    
Critical Care Time    
Critical Care Time: No

## 2025-01-02 NOTE — ECG_ITS
--------------- APPROVED REPORT -------------- 
 
 
Exam: Resting ECG 
 
HR:86 bpm          
 
ECG Measurements 
Heart Rate               86                   AXES                     
 
IA                       136                     P                     
    64                      
QRSd                         94                   QRS                  
     49                      
QT                       347                    T    11                
 
QTc                      390                           
 
Conclusion 
SINUS RHYTHM 
NONSPECIFIC T-WAVE ABNORMALITY 
BORDERLINE ECG 
UNCONFIRMED REPORT 
 
 
Electronically signed by : Andrew Camejo,  01/02/2025 15:34:04

## 2025-02-04 ENCOUNTER — OFFICE VISIT (OUTPATIENT)
Dept: FAMILY MEDICINE CLINIC | Facility: CLINIC | Age: 30
End: 2025-02-04
Payer: COMMERCIAL

## 2025-02-04 VITALS
OXYGEN SATURATION: 98 % | SYSTOLIC BLOOD PRESSURE: 118 MMHG | BODY MASS INDEX: 19.83 KG/M2 | RESPIRATION RATE: 14 BRPM | TEMPERATURE: 98.2 F | WEIGHT: 119 LBS | DIASTOLIC BLOOD PRESSURE: 66 MMHG | HEART RATE: 87 BPM | HEIGHT: 65 IN

## 2025-02-04 DIAGNOSIS — F41.9 ANXIETY: ICD-10-CM

## 2025-02-04 DIAGNOSIS — F32.1 CURRENT MODERATE EPISODE OF MAJOR DEPRESSIVE DISORDER WITHOUT PRIOR EPISODE: ICD-10-CM

## 2025-02-04 DIAGNOSIS — G90.A POTS (POSTURAL ORTHOSTATIC TACHYCARDIA SYNDROME): ICD-10-CM

## 2025-02-04 DIAGNOSIS — R55 SYNCOPE AND COLLAPSE: Primary | ICD-10-CM

## 2025-02-04 DIAGNOSIS — H60.392 OTHER INFECTIVE ACUTE OTITIS EXTERNA OF LEFT EAR: ICD-10-CM

## 2025-02-04 DIAGNOSIS — R00.2 HEART PALPITATIONS: ICD-10-CM

## 2025-02-04 PROCEDURE — 99214 OFFICE O/P EST MOD 30 MIN: CPT | Performed by: PHYSICIAN ASSISTANT

## 2025-02-04 RX ORDER — CITALOPRAM HYDROBROMIDE 10 MG/1
10 TABLET ORAL DAILY
Qty: 30 TABLET | Refills: 2 | Status: SHIPPED | OUTPATIENT
Start: 2025-02-04

## 2025-02-04 RX ORDER — CLOTRIMAZOLE 1 G/ML
SOLUTION TOPICAL
Qty: 10 ML | Refills: 0 | Status: SHIPPED | OUTPATIENT
Start: 2025-02-04

## 2025-02-04 NOTE — PROGRESS NOTES
Subjective   Salud Bocanegra is a 29 y.o. female.     History of Present Illness   History of Present Illness  The patient presents for evaluation of chest discomfort, ear pain, anxiety, and POTS.    She has been experiencing chest discomfort for an extended period, typically attributing it to her POTS. However, on one occasion, the pain radiated down her arm and into her neck, prompting her to seek medical attention. She also reports palpitations, particularly when she is unwell. During a physical therapy session following knee surgery in 06/2024, she experienced a significant drop in her heart rate to 48 beats per minute, leading her to discontinue her beta blocker medication. She has not undergone a tilt table test. She has a history of low blood pressure since childhood and first noticed heart palpitations in 2021. She has a pet dog at home. She reports that her symptoms are exacerbated during the summer months. She has a history of joint issues, including hip dislocation during her school years, which required the use of crutches. She also reports nerve pain in her trapezius muscles, a symptom that has been present for the past 6 months. She has been advised to take magnesium supplements but is unsure if her magnesium levels have been checked. She has a history of heavy menstrual cycles and has undergone a hysterectomy.    She has been experiencing ear pain and has been using ear drops without relief. She has attempted to remove what she believes to be ear wax with a Q-tip.    She has a history of anxiety and depression, which she believes are triggered by her health conditions. She has previously been prescribed Prozac and Celexa, with the latter proving more effective. She is considering resuming Celexa. She has not engaged in therapy due to time constraints. She works full-time in a factory, a job she finds physically demanding and believes contributes to her depression. She reports no suicidal  "ideation or self-harming behaviors. She has a history of postpartum psychosis following the birth of her two children. She has been using FMLA intermittently, taking approximately 8 days off per month. She reports that her work environment is a significant trigger for her symptoms, leading to muscle tension and seizure-like episodes. She has not undergone EEG testing for seizure disorders.    SOCIAL HISTORY  She works full-time in a factory.    MEDICATIONS  Past: Prozac, Celexa       The following portions of the patient's history were reviewed and updated as appropriate: allergies, current medications, past family history, past medical history, past social history, past surgical history, and problem list.    Review of Systems  As noted per HPI     Objective   Blood pressure 118/66, pulse 87, temperature 98.2 °F (36.8 °C), resp. rate 14, height 165.1 cm (65\"), weight 54 kg (119 lb), SpO2 98%. Body mass index is 19.8 kg/m².   Physical Exam  Vitals reviewed.   Constitutional:       Appearance: She is obese.   HENT:      Head: Normocephalic.      Right Ear: Tympanic membrane, ear canal and external ear normal.      Left Ear: Tympanic membrane, ear canal and external ear normal.      Nose: Nose normal.      Mouth/Throat:      Pharynx: No oropharyngeal exudate or posterior oropharyngeal erythema.   Cardiovascular:      Rate and Rhythm: Normal rate and regular rhythm.   Pulmonary:      Effort: Pulmonary effort is normal.      Breath sounds: Normal breath sounds.   Neurological:      Mental Status: She is alert and oriented to person, place, and time.   Psychiatric:         Mood and Affect: Mood normal.         Behavior: Behavior normal.         Results  Laboratory Studies  Thyroid uptake test showed a slight abnormality, indicating a potential underactive thyroid condition.    Assessment & Plan   Assessment & Plan  1. Potential hypothyroidism.  Her symptoms, including anxiety, weight loss, and heart palpitations, could be " indicative of an overactive thyroid. However, her recent lab results suggest a slightly underactive thyroid. A detailed thyroid panel will be rechecked to confirm the diagnosis. If the results indicate hypothyroidism, a low dose of thyroid medication will be considered.    2. Ear pain.  The ear canal appears inflamed with small white pustule pockets, suggesting a possible fungal infection. The use of Q-tips may have exacerbated the condition. An antifungal eardrop will be prescribed to manage the infection.    3. Anxiety.  She has a history of anxiety and depression, which may be exacerbated by her current health conditions. Celexa 10 mg will be prescribed, starting with a low dose and increasing as needed. A 30-day supply will be provided, and she is advised to keep us updated on her progress.    4. Postural Orthostatic Tachycardia Syndrome (POTS).  She experiences symptoms consistent with POTS, including sudden drops in blood pressure with position changes. A referral to the autonomic clinic at [facility] will be made for further evaluation and management. She is advised to call us back if she does not hear anything about scheduling an appointment within 2 weeks.    5. Health maintenance.  Iron and B12 levels will be checked to rule out any underlying deficiencies that could contribute to her heart palpitations. She is advised to schedule her annual physical in the next few months with Dr. Stevens.    PROCEDURE  The patient underwent knee surgery in 06/2024.  She has a history of heavy menstrual cycles and has undergone a hysterectomy.     Diagnoses and all orders for this visit:    1. Syncope and collapse (Primary)  -     Ambulatory Referral to Neurology  -     TSH  -     T4, free  -     T3, free  -     Thyroid Antibodies  -     T3 Uptake & FTI  -     Magnesium    2. POTS (postural orthostatic tachycardia syndrome)  -     Ambulatory Referral to Neurology    3. Heart palpitations  -     TSH  -     T4, free  -      T3, free  -     Thyroid Antibodies  -     T3 Uptake & FTI  -     Magnesium  -     Iron Profile  -     Vitamin B12  -     Folate    4. Anxiety  -     citalopram (CeleXA) 10 MG tablet; Take 1 tablet by mouth Daily.  Dispense: 30 tablet; Refill: 2  -     TSH  -     T4, free  -     T3, free  -     Thyroid Antibodies  -     T3 Uptake & FTI  -     Magnesium  -     Iron Profile  -     Vitamin B12  -     Folate    5. Current moderate episode of major depressive disorder without prior episode  -     citalopram (CeleXA) 10 MG tablet; Take 1 tablet by mouth Daily.  Dispense: 30 tablet; Refill: 2  -     TSH  -     T4, free  -     T3, free  -     Thyroid Antibodies  -     T3 Uptake & FTI  -     Magnesium  -     Iron Profile  -     Vitamin B12  -     Folate    6. Other infective acute otitis externa of left ear  -     clotrimazole (LOTRIMIN) 1 % external solution; Apply 2-3 drops in left ear BID X 14 days  Dispense: 10 mL; Refill: 0               Patient or patient representative verbalized consent for the use of Ambient Listening during the visit with  MAI Layton for chart documentation. 2/11/2025  15:46 EST

## 2025-02-05 LAB
FOLATE SERPL-MCNC: 3.9 NG/ML
FT4I SERPL CALC-MCNC: 2 (ref 1.2–4.9)
IRON SATN MFR SERPL: 26 % (ref 15–55)
IRON SERPL-MCNC: 61 UG/DL (ref 27–159)
MAGNESIUM SERPL-MCNC: 2 MG/DL (ref 1.6–2.3)
T3FREE SERPL-MCNC: 3.3 PG/ML (ref 2–4.4)
T3RU NFR SERPL: 26 % (ref 24–39)
T4 FREE SERPL-MCNC: 1.05 NG/DL (ref 0.82–1.77)
T4 SERPL-MCNC: 7.6 UG/DL (ref 4.5–12)
THYROGLOB AB SERPL-ACNC: 1.1 IU/ML (ref 0–0.9)
THYROPEROXIDASE AB SERPL-ACNC: 19 IU/ML (ref 0–34)
TIBC SERPL-MCNC: 233 UG/DL (ref 250–450)
TSH SERPL DL<=0.005 MIU/L-ACNC: 0.66 UIU/ML (ref 0.45–4.5)
UIBC SERPL-MCNC: 172 UG/DL (ref 131–425)
VIT B12 SERPL-MCNC: 393 PG/ML (ref 232–1245)

## 2025-02-19 ENCOUNTER — TELEPHONE (OUTPATIENT)
Dept: FAMILY MEDICINE CLINIC | Facility: CLINIC | Age: 30
End: 2025-02-19
Payer: COMMERCIAL

## 2025-02-19 ENCOUNTER — TELEMEDICINE (OUTPATIENT)
Dept: FAMILY MEDICINE CLINIC | Facility: TELEHEALTH | Age: 30
End: 2025-02-19
Payer: COMMERCIAL

## 2025-02-19 DIAGNOSIS — B02.9 HERPES ZOSTER WITHOUT COMPLICATION: Primary | ICD-10-CM

## 2025-02-19 DIAGNOSIS — R11.0 NAUSEA: ICD-10-CM

## 2025-02-19 RX ORDER — IBUPROFEN 600 MG/1
600 TABLET, FILM COATED ORAL EVERY 6 HOURS PRN
Qty: 28 TABLET | Refills: 0 | Status: SHIPPED | OUTPATIENT
Start: 2025-02-19

## 2025-02-19 RX ORDER — ACYCLOVIR 800 MG/1
800 TABLET ORAL 4 TIMES DAILY
Qty: 28 TABLET | Refills: 0 | Status: SHIPPED | OUTPATIENT
Start: 2025-02-19 | End: 2025-02-26

## 2025-02-19 RX ORDER — ONDANSETRON 8 MG/1
8 TABLET, FILM COATED ORAL EVERY 8 HOURS PRN
Qty: 9 TABLET | Refills: 0 | Status: SHIPPED | OUTPATIENT
Start: 2025-02-19

## 2025-02-19 NOTE — PROGRESS NOTES
Mode of Visit: Video  Location of patient: -HOME-  Location of provider: +HOME+  You have chosen to receive care through a telehealth visit.  The patient has signed the video visit consent form.  The visit included audio and video interaction. No technical issues occurred during this visit.    MANGO Bocanegra is a 29 y.o. female  presents with complaint of rash.  She reports that she thinks that she may have shingles and unfortunately she is noted in.  Her symptoms started 3 days ago.  At that time her skin hurt to touch.  She now has a clustered rash on her left hip and left back.  She reports that it hurts to the bone and that it even hurts in her groin.  She has not had shingles in the past. She has had symptoms for 3 days now.     Review of Systems   Constitutional:  Positive for chills and fatigue (baseline mildly increased).   Gastrointestinal:  Positive for nausea.   Skin:  Positive for rash (clustered painful rash left hip, back).       Past Medical History:   Diagnosis Date    Abnormal electrocardiogram     Anxiety     Depression     Deviated septum     Dizziness and giddiness     Interstitial cystitis 2020    Nicotine dependence, unspecified, uncomplicated     Precordial pain        Family History   Problem Relation Age of Onset    Depression Mother     Heart disease Father     Kidney disease Father     Heart failure Father        Social History     Socioeconomic History    Marital status:    Tobacco Use    Smoking status: Former     Current packs/day: 0.00     Average packs/day: 0.5 packs/day for 13.0 years (6.5 ttl pk-yrs)     Types: Cigarettes     Start date:      Quit date: 2017     Years since quittin.1     Passive exposure: Past    Smokeless tobacco: Never    Tobacco comments:     Vapes    Vaping Use    Vaping status: Every Day    Passive vaping exposure: Yes   Substance and Sexual Activity    Alcohol use: Never    Drug use: Never    Sexual activity: Defer       Salud Haro  Daljit  reports that she quit smoking about 8 years ago. Her smoking use included cigarettes. She started smoking about 21 years ago. She has a 6.5 pack-year smoking history. She has been exposed to tobacco smoke. She has never used smokeless tobacco. Clarification. She does vape and has been advised to quit. She does not dip.      Breastfeeding No     PHYSICAL EXAM  Physical Exam   Constitutional: She is oriented to person, place, and time. She appears well-developed.   HENT:   Head: Normocephalic and atraumatic.   Nose: Nose normal.   Eyes: Lids are normal. Right eye exhibits no discharge. Left eye exhibits no discharge. Right conjunctiva is not injected. Left conjunctiva is not injected.   Pulmonary/Chest:  No respiratory distress.  Neurological: She is alert and oriented to person, place, and time. No cranial nerve deficit.   Skin: Rash noted.   Clustered rash, patient left hip and left back   Psychiatric: She has a normal mood and affect. Her speech is normal and behavior is normal. Judgment and thought content normal.       Results for orders placed or performed in visit on 02/04/25   TSH    Collection Time: 02/04/25  4:03 PM    Specimen: Blood   Result Value Ref Range    TSH 0.662 0.450 - 4.500 uIU/mL   T4, free    Collection Time: 02/04/25  4:03 PM    Specimen: Blood   Result Value Ref Range    Free T4 1.05 0.82 - 1.77 ng/dL   T3, free    Collection Time: 02/04/25  4:03 PM    Specimen: Blood   Result Value Ref Range    T3, Free 3.3 2.0 - 4.4 pg/mL   Thyroid Antibodies    Collection Time: 02/04/25  4:03 PM    Specimen: Blood   Result Value Ref Range    Thyroid Peroxidase Antibody 19 0 - 34 IU/mL    Thyroglobulin Ab 1.1 (H) 0.0 - 0.9 IU/mL   T3 Uptake & FTI    Collection Time: 02/04/25  4:03 PM    Specimen: Blood   Result Value Ref Range    T4, Total 7.6 4.5 - 12.0 ug/dL    T3 Uptake 26 24 - 39 %    Free Thyroxine Index 2.0 1.2 - 4.9   Magnesium    Collection Time: 02/04/25  4:03 PM    Specimen: Blood    Result Value Ref Range    Magnesium 2.0 1.6 - 2.3 mg/dL   Iron Profile    Collection Time: 02/04/25  4:03 PM    Specimen: Blood   Result Value Ref Range    TIBC 233 (L) 250 - 450 ug/dL    UIBC 172 131 - 425 ug/dL    Iron 61 27 - 159 ug/dL    Iron Saturation 26 15 - 55 %   Vitamin B12    Collection Time: 02/04/25  4:03 PM    Specimen: Blood   Result Value Ref Range    Vitamin B-12 393 232 - 1,245 pg/mL   Folate    Collection Time: 02/04/25  4:03 PM    Specimen: Blood   Result Value Ref Range    Folate 3.9 >3.0 ng/mL       Diagnoses and all orders for this visit:    1. Herpes zoster without complication (Primary)    2. Nausea    Other orders  -     acyclovir (ZOVIRAX) 800 MG tablet; Take 1 tablet by mouth 4 (Four) Times a Day for 7 days.  Dispense: 28 tablet; Refill: 0  -     ondansetron (ZOFRAN) 8 MG tablet; Take 1 tablet by mouth Every 8 (Eight) Hours As Needed for Nausea.  Dispense: 9 tablet; Refill: 0  -     ibuprofen (ADVIL,MOTRIN) 600 MG tablet; Take 1 tablet by mouth Every 6 (Six) Hours As Needed for Mild Pain.  Dispense: 28 tablet; Refill: 0    Acyclovir as directed  Zofran as needed for nausea  Ibuprofen as directed for pain or fever, take with food  May take Tylenol for breakthrough pain    FOLLOW-UP  If symptoms worsen or persist follow up with PCP, Virtual Care or Urgent Care    Patient verbalizes understanding of medication dosage, comfort measures, instructions for treatment and follow-up.    HIRAL Hedrick  02/19/2025  11:02 EST    The use of a video visit has been reviewed with the patient and verbal informed consent has been obtained. Myself and Salud Bocanegra participated in this visit. The patient is located in [address] [place] [address].    I am located in [place], [place]. [employer] and [employer] were utilized. I spent 20 minutes in the patient's chart for this visit.

## 2025-02-19 NOTE — TELEPHONE ENCOUNTER
Caller: Salud Bocanegra    Relationship: Self    Best call back number:        What is the provider, practice or medical service name: [facility]         Any additional details: PATENT ADVISED SHE WOULD LIKE A CALL BACK FROM ABRAHAN'S  NURSE REGARDING HER REFERRAL TO [facility]

## 2025-02-27 ENCOUNTER — OFFICE VISIT (OUTPATIENT)
Dept: FAMILY MEDICINE CLINIC | Facility: CLINIC | Age: 30
End: 2025-02-27
Payer: COMMERCIAL

## 2025-02-27 VITALS
BODY MASS INDEX: 19.69 KG/M2 | HEIGHT: 65 IN | RESPIRATION RATE: 16 BRPM | WEIGHT: 118.2 LBS | HEART RATE: 74 BPM | TEMPERATURE: 98 F | SYSTOLIC BLOOD PRESSURE: 92 MMHG | DIASTOLIC BLOOD PRESSURE: 50 MMHG | OXYGEN SATURATION: 100 %

## 2025-02-27 DIAGNOSIS — R55 SYNCOPE, UNSPECIFIED SYNCOPE TYPE: ICD-10-CM

## 2025-02-27 DIAGNOSIS — G90.A POTS (POSTURAL ORTHOSTATIC TACHYCARDIA SYNDROME): Primary | ICD-10-CM

## 2025-02-27 PROCEDURE — 99213 OFFICE O/P EST LOW 20 MIN: CPT | Performed by: FAMILY MEDICINE

## 2025-02-27 RX ORDER — MIDODRINE HYDROCHLORIDE 2.5 MG/1
2.5 TABLET ORAL 2 TIMES DAILY
Qty: 60 TABLET | Refills: 0 | Status: SHIPPED | OUTPATIENT
Start: 2025-02-27

## 2025-02-27 NOTE — PROGRESS NOTES
Chief Complaint  POTS (Discuss POTS treatment, lightheaded, dizzy. )    Subjective          Salud Bocanegra presents to [facility]    History of Present Illness  The patient presents for evaluation of postural orthostatic tachycardia syndrome (POTS).    She has been referred to [facility] for further evaluation of her POTS, with the referral process currently underway. She reports experiencing episodes of presyncope, particularly when transitioning from a seated to standing position. These episodes have been more frequent over the past month, with one instance occurring while bending down to  an object from the floor. She also identifies rapid eating or drinking as potential triggers. She has discontinued metoprolol due to its side effect of bradycardia, with her heart rate dropping into the 40s. Despite this, she did not experience syncope. She has been monitoring her blood pressure at home, which has consistently read 80/40. She has been consuming Gatorade and taking salt pills, but these interventions do not seem to be effective. She has previously tried midodrine in combination with metoprolol, but this resulted in her feeling terrible in general. She has a scheduled appointment with Dr. Whelan on 05/22/2025.    Supplemental Information  She had knee surgery in June 2024 and could not take pain pills because her blood pressure would plummet, causing seizure-like activity. The only medication she has been able to take without any reaction is ibuprofen. Even if she takes Mucinex, she experiences a crash.        The following portions of the patient's history were reviewed and updated as appropriate: allergies, current medications, past family history, past medical history, past social history, past surgical history, and problem list.    Objective      Physical Exam  Vitals reviewed.   Constitutional:       Appearance: Normal appearance.   Cardiovascular:      Rate and  Rhythm: Normal rate and regular rhythm.      Heart sounds: Normal heart sounds.   Pulmonary:      Effort: Pulmonary effort is normal.      Breath sounds: Normal breath sounds.   Neurological:      Mental Status: She is alert.        Physical Exam      Result Review :       Results               Assessment and Plan    Diagnoses and all orders for this visit:    1. POTS (postural orthostatic tachycardia syndrome) (Primary)  -     midodrine (PROAMATINE) 2.5 MG tablet; Take 1 tablet by mouth 2 (Two) Times a Day. Use as needed systolic pressure less than 100 every 12 hours  Dispense: 60 tablet; Refill: 0    2. Syncope, unspecified syncope type  -     midodrine (PROAMATINE) 2.5 MG tablet; Take 1 tablet by mouth 2 (Two) Times a Day. Use as needed systolic pressure less than 100 every 12 hours  Dispense: 60 tablet; Refill: 0      Assessment & Plan  1. Postural orthostatic tachycardia syndrome (POTS).  Her blood pressure is currently on the lower end, which correlates with her exacerbated symptoms. She has been experiencing episodes of passing out, particularly when transitioning from sitting to standing or after eating. She has previously taken metoprolol, which lowered her heart rate significantly, and midodrine, which caused adverse effects when taken together. A prescription for midodrine 2.5 mg, to be taken twice daily as needed, will be provided. She is advised to monitor her blood pressure at home and take midodrine on days when her systolic blood pressure less than 100. A referral to [facility] has been made, and she is awaiting further instructions via email.          Follow Up   No follow-ups on file.    Patient or patient representative verbalized consent for the use of Ambient Listening during the visit with  Ramona Mcnally DO for chart documentation. 2/27/2025  15:12 EST  Patient was given instructions and counseling regarding her condition or for health maintenance advice. Please see specific information  pulled into the AVS if appropriate.

## 2025-05-15 ENCOUNTER — TELEPHONE (OUTPATIENT)
Dept: CARDIOLOGY | Facility: CLINIC | Age: 30
End: 2025-05-15
Payer: COMMERCIAL

## 2025-08-05 ENCOUNTER — OFFICE VISIT (OUTPATIENT)
Dept: FAMILY MEDICINE CLINIC | Facility: CLINIC | Age: 30
End: 2025-08-05
Payer: COMMERCIAL

## 2025-08-05 VITALS
HEART RATE: 95 BPM | HEIGHT: 65 IN | TEMPERATURE: 99 F | DIASTOLIC BLOOD PRESSURE: 60 MMHG | OXYGEN SATURATION: 98 % | WEIGHT: 116 LBS | BODY MASS INDEX: 19.33 KG/M2 | SYSTOLIC BLOOD PRESSURE: 100 MMHG

## 2025-08-05 DIAGNOSIS — L03.116 CELLULITIS OF LEFT THIGH: Primary | ICD-10-CM

## 2025-08-05 DIAGNOSIS — R11.0 NAUSEA: ICD-10-CM

## 2025-08-05 PROCEDURE — 99213 OFFICE O/P EST LOW 20 MIN: CPT | Performed by: PHYSICIAN ASSISTANT

## 2025-08-05 RX ORDER — MUPIROCIN 2 %
1 OINTMENT (GRAM) TOPICAL 3 TIMES DAILY
Qty: 22 G | Refills: 0 | Status: SHIPPED | OUTPATIENT
Start: 2025-08-05

## 2025-08-05 RX ORDER — ONDANSETRON 8 MG/1
8 TABLET, FILM COATED ORAL EVERY 8 HOURS PRN
Qty: 20 TABLET | Refills: 5 | Status: SHIPPED | OUTPATIENT
Start: 2025-08-05

## 2025-08-05 RX ORDER — PREDNISONE 20 MG/1
20 TABLET ORAL 2 TIMES DAILY
Qty: 10 TABLET | Refills: 0 | Status: SHIPPED | OUTPATIENT
Start: 2025-08-05

## 2025-08-05 RX ORDER — DOXYCYCLINE 100 MG/1
100 CAPSULE ORAL 2 TIMES DAILY
Qty: 20 CAPSULE | Refills: 0 | Status: SHIPPED | OUTPATIENT
Start: 2025-08-05